# Patient Record
Sex: FEMALE | Race: WHITE | Employment: FULL TIME | URBAN - METROPOLITAN AREA
[De-identification: names, ages, dates, MRNs, and addresses within clinical notes are randomized per-mention and may not be internally consistent; named-entity substitution may affect disease eponyms.]

---

## 2017-07-17 RX ORDER — ESCITALOPRAM OXALATE 10 MG/1
TABLET ORAL
Qty: 90 TABLET | Refills: 0 | Status: SHIPPED | OUTPATIENT
Start: 2017-07-17 | End: 2017-10-21

## 2017-07-17 NOTE — TELEPHONE ENCOUNTER
LOV: 8/26/16  Future office visit: no upcoming visit   Last labs: no upcoming visit   Last RX: 8/26/16 #90 #3 Refill   Per protocol: Route to provider

## 2017-10-21 DIAGNOSIS — Z13.1 SCREENING FOR DIABETES MELLITUS: Primary | ICD-10-CM

## 2017-10-21 DIAGNOSIS — Z13.0 SCREENING FOR DEFICIENCY ANEMIA: ICD-10-CM

## 2017-10-21 DIAGNOSIS — Z13.220 SCREENING, LIPID: ICD-10-CM

## 2017-10-21 RX ORDER — ESCITALOPRAM OXALATE 10 MG/1
TABLET ORAL
Qty: 90 TABLET | Refills: 0 | Status: SHIPPED | OUTPATIENT
Start: 2017-10-21 | End: 2018-02-09

## 2017-10-21 NOTE — TELEPHONE ENCOUNTER
Please call patient. She is due for check-up and fasting labs. Orders are in.  They will need to be printed and sent or given to patient since she goes to [facility].

## 2017-10-21 NOTE — TELEPHONE ENCOUNTER
Medication(s) to Refill:   Pending Prescriptions Disp Refills    ESCITALOPRAM 10 MG Oral Tab [Pharmacy Med Name: ESCITALOPRAM  10MG  TAB] 90 tablet      Sig: TAKE 1 TABLET BY MOUTH  DAILY           Last Time Medication was Filled: 7/17/17- 90 tablets with

## 2017-10-31 NOTE — TELEPHONE ENCOUNTER
Future Appointments  Date Time Provider Nicolasa Dooley   11/13/2017 10:00 AM Sanjeev Better, DO [facility]     She uses [facility] aware must fast, Please call cell#  once ready for .  Pt will come in to  orders

## 2017-11-13 ENCOUNTER — OFFICE VISIT (OUTPATIENT)
Dept: INTERNAL MEDICINE CLINIC | Facility: CLINIC | Age: 62
End: 2017-11-13

## 2017-11-13 VITALS
HEIGHT: 67 IN | BODY MASS INDEX: 22.91 KG/M2 | DIASTOLIC BLOOD PRESSURE: 64 MMHG | TEMPERATURE: 98 F | WEIGHT: 146 LBS | RESPIRATION RATE: 16 BRPM | HEART RATE: 68 BPM | SYSTOLIC BLOOD PRESSURE: 110 MMHG

## 2017-11-13 DIAGNOSIS — B35.3 TINEA PEDIS OF BOTH FEET: ICD-10-CM

## 2017-11-13 DIAGNOSIS — Z12.11 SCREENING FOR MALIGNANT NEOPLASM OF COLON: ICD-10-CM

## 2017-11-13 DIAGNOSIS — Z12.31 VISIT FOR SCREENING MAMMOGRAM: ICD-10-CM

## 2017-11-13 DIAGNOSIS — Z00.00 ANNUAL PHYSICAL EXAM: Primary | ICD-10-CM

## 2017-11-13 DIAGNOSIS — L84 CORN OF FOOT: ICD-10-CM

## 2017-11-13 PROCEDURE — 99396 PREV VISIT EST AGE 40-64: CPT | Performed by: FAMILY MEDICINE

## 2017-11-13 PROCEDURE — 90471 IMMUNIZATION ADMIN: CPT | Performed by: FAMILY MEDICINE

## 2017-11-13 PROCEDURE — 99213 OFFICE O/P EST LOW 20 MIN: CPT | Performed by: FAMILY MEDICINE

## 2017-11-13 PROCEDURE — 90686 IIV4 VACC NO PRSV 0.5 ML IM: CPT | Performed by: FAMILY MEDICINE

## 2017-11-13 NOTE — PROGRESS NOTES
HPI:    Patient ID: Abel Perea is a 59year old female. HPI Here for annual check-up. Patient has warts on the bottom of her foot that she doesn't know what to do for. Has calluses as well and is now starting to notice pain when walking at times.  Margarito Brown rash.   Neurological: Negative for dizziness, weakness, numbness and headaches. Hematological: Negative for adenopathy. Does not bruise/bleed easily. Psychiatric/Behavioral: Negative for dysphoric mood. The patient is not nervous/anxious. go.   2. Reviewed preventive health recommendations with patient. Reviewed lab results. Encouraged regular exercise and healthy eating. 3. Order placed. Encouraged patient to have done.    4. Soak feet daily in warm soapy water, use file to gently file ca

## 2017-11-13 NOTE — PATIENT INSTRUCTIONS
Prevention Guidelines, Women Ages 48 to 59  Screening tests and vaccines are an important part of managing your health. Health counseling is essential, too. Below are guidelines for these, for women ages 48 to 59.  Talk with your healthcare provider to ma Lung cancer Adults age 54 to [de-identified] who have smoked Yearly screening in smokers with 30 pack-year history of smoking or who quit within 15 years   Obesity All women in this age group At routine exams   Osteoporosis Women who are postmenopausal Ask your healthc PPSV23: 1 to 2 doses through age 59, or 1 dose at 72 or older (protects against 23 types of pneumococcal bacteria)   Tetanus/diphtheria/pertussis (Td/Tdap) booster All women in this age group Td every 10 years, or a one-time dose of Tdap instead of a Td kenia

## 2017-11-14 ENCOUNTER — HOSPITAL ENCOUNTER (OUTPATIENT)
Dept: MAMMOGRAPHY | Age: 62
Discharge: HOME | End: 2017-11-14
Attending: FAMILY MEDICINE
Payer: COMMERCIAL

## 2017-11-14 DIAGNOSIS — Z12.31 VISIT FOR SCREENING MAMMOGRAM: ICD-10-CM

## 2017-11-14 PROCEDURE — 77067 SCR MAMMO BI INCL CAD: CPT | Performed by: FAMILY MEDICINE

## 2018-02-10 RX ORDER — ESCITALOPRAM OXALATE 10 MG/1
TABLET ORAL
Qty: 90 TABLET | Refills: 2 | Status: SHIPPED | OUTPATIENT
Start: 2018-02-10 | End: 2018-10-22

## 2018-02-10 NOTE — TELEPHONE ENCOUNTER
Medication(s) to Refill:   Pending Prescriptions Disp Refills    ESCITALOPRAM 10 MG Oral Tab [Pharmacy Med Name: ESCITALOPRAM  10MG  TAB] 90 tablet      Sig: TAKE 1 TABLET BY MOUTH  DAILY           Last Time Medication was Filled:  10/21/17- 90 tablets wit

## 2018-10-22 DIAGNOSIS — Z13.1 SCREENING FOR DIABETES MELLITUS: ICD-10-CM

## 2018-10-22 DIAGNOSIS — Z13.220 SCREENING, LIPID: Primary | ICD-10-CM

## 2018-10-22 DIAGNOSIS — Z13.0 SCREENING FOR DEFICIENCY ANEMIA: ICD-10-CM

## 2018-10-23 RX ORDER — ESCITALOPRAM OXALATE 10 MG/1
TABLET ORAL
Qty: 90 TABLET | Refills: 0 | Status: SHIPPED | OUTPATIENT
Start: 2018-10-23 | End: 2018-12-16

## 2018-10-23 NOTE — TELEPHONE ENCOUNTER
E request  LOV: 11/13/17- annual px  Last rx: 2/10/10- 90 tablets with 2 refills  No future appointments.   Per protocol to provider

## 2018-10-23 NOTE — TELEPHONE ENCOUNTER
Please call patient. She will be due for annual check-up on or after 11/13/18. She goes to [facility] for her labs so orders are in and will need to be printed and sent to her or she can pick them up.

## 2018-11-02 NOTE — TELEPHONE ENCOUNTER
Future Appointments   Date Time Provider Nicolasa Soumya   11/19/2018 10:00 AM Raquel Hint, DO [facility]     Put lab orders up front for . Pt aware they are up there.

## 2018-11-19 ENCOUNTER — OFFICE VISIT (OUTPATIENT)
Dept: INTERNAL MEDICINE CLINIC | Facility: CLINIC | Age: 63
End: 2018-11-19
Payer: COMMERCIAL

## 2018-11-19 VITALS
WEIGHT: 148 LBS | RESPIRATION RATE: 16 BRPM | DIASTOLIC BLOOD PRESSURE: 80 MMHG | BODY MASS INDEX: 23 KG/M2 | SYSTOLIC BLOOD PRESSURE: 118 MMHG | HEART RATE: 72 BPM | TEMPERATURE: 98 F

## 2018-11-19 DIAGNOSIS — M79.644 PAIN OF FINGER OF RIGHT HAND: ICD-10-CM

## 2018-11-19 DIAGNOSIS — Z11.59 NEED FOR HEPATITIS C SCREENING TEST: ICD-10-CM

## 2018-11-19 DIAGNOSIS — Z13.0 SCREENING FOR DEFICIENCY ANEMIA: ICD-10-CM

## 2018-11-19 DIAGNOSIS — Z00.00 ANNUAL PHYSICAL EXAM: Primary | ICD-10-CM

## 2018-11-19 DIAGNOSIS — M85.80 OSTEOPENIA, UNSPECIFIED LOCATION: ICD-10-CM

## 2018-11-19 DIAGNOSIS — Z12.31 VISIT FOR SCREENING MAMMOGRAM: ICD-10-CM

## 2018-11-19 PROCEDURE — 99396 PREV VISIT EST AGE 40-64: CPT | Performed by: FAMILY MEDICINE

## 2018-11-19 NOTE — PATIENT INSTRUCTIONS
Prevention Guidelines, Women Ages 48 to 59  Screening tests and vaccines are an important part of managing your health. A screening test is done to find possible disorders or diseases in people who don't have any symptoms.  The goal is to find a disease e Chlamydia Women at increased risk for infection At routine exams   Colorectal cancer All women in this age group Flexible sigmoidoscopy every 5 years, or colonoscopy every 10 years, or double-contrast barium enema every 5 years; yearly fecal occult blood t Hepatitis B Women at increased risk for infection – talk with your healthcare provider 3 doses over 6 months; second dose should be given 1 month after the first dose; the third dose should be given at least 2 months after the second dose and at least 4 mo Use of daily aspirin Women ages 54 and up in this age group who are at risk for cardiovascular health problems such as stroke When your risk is known   Use of tobacco and the health effects it can cause All women in this age group Every exam   1Amereryn Ca

## 2018-11-19 NOTE — PROGRESS NOTES
HPI:    Patient ID: Franklin Ryder is a 57year old female. HPI Here for annual check-up. Patient notes swollen DIP joint of right 2nd finger that at times is painful but not enough to try to do something about it at this time.    Staying physically acti Hobby Hazards: Not Asked        Sleep Concern: Not Asked        Back Care: Not Asked        Bike Helmet: Not Asked        Self-Exams: Not Asked    Social History Narrative      Not on file    Family History   Adopted: Yes   Family history unknown: Bishop Sauceda Cardiovascular: Normal rate, regular rhythm and normal heart sounds. Pulmonary/Chest: Effort normal and breath sounds normal. Right breast exhibits no inverted nipple, no mass, no nipple discharge, no skin change and no tenderness.  Left breast exhibits n

## 2018-11-26 ENCOUNTER — NURSE ONLY (OUTPATIENT)
Dept: INTERNAL MEDICINE CLINIC | Facility: CLINIC | Age: 63
End: 2018-11-26
Payer: COMMERCIAL

## 2018-11-26 PROCEDURE — 90472 IMMUNIZATION ADMIN EACH ADD: CPT | Performed by: FAMILY MEDICINE

## 2018-11-26 PROCEDURE — 90653 IIV ADJUVANT VACCINE IM: CPT | Performed by: FAMILY MEDICINE

## 2018-11-26 PROCEDURE — 90471 IMMUNIZATION ADMIN: CPT | Performed by: FAMILY MEDICINE

## 2018-11-26 PROCEDURE — 90670 PCV13 VACCINE IM: CPT | Performed by: FAMILY MEDICINE

## 2018-11-26 NOTE — PROGRESS NOTES
Pt here for flu shot and Prevnar 13, tolerated both well without difficulty in R arm. Both VIS given. Consent sent to scanning.

## 2018-11-30 ENCOUNTER — TELEPHONE (OUTPATIENT)
Dept: INTERNAL MEDICINE CLINIC | Facility: CLINIC | Age: 63
End: 2018-11-30

## 2018-11-30 NOTE — TELEPHONE ENCOUNTER
Please call patient.  We received the results of the additional blood tests she did- CBC and Hep C and everything is normal.

## 2018-12-13 ENCOUNTER — HOSPITAL ENCOUNTER (OUTPATIENT)
Dept: MAMMOGRAPHY | Age: 63
Discharge: HOME | End: 2018-12-13
Attending: FAMILY MEDICINE
Payer: COMMERCIAL

## 2018-12-13 ENCOUNTER — HOSPITAL ENCOUNTER (OUTPATIENT)
Dept: BONE DENSITY | Age: 63
Discharge: HOME | End: 2018-12-13
Attending: FAMILY MEDICINE
Payer: COMMERCIAL

## 2018-12-13 DIAGNOSIS — Z12.31 VISIT FOR SCREENING MAMMOGRAM: ICD-10-CM

## 2018-12-13 DIAGNOSIS — M85.80 OSTEOPENIA, UNSPECIFIED LOCATION: ICD-10-CM

## 2018-12-13 PROCEDURE — 77080 DXA BONE DENSITY AXIAL: CPT | Performed by: FAMILY MEDICINE

## 2018-12-13 PROCEDURE — 77063 BREAST TOMOSYNTHESIS BI: CPT | Performed by: FAMILY MEDICINE

## 2018-12-13 PROCEDURE — 77067 SCR MAMMO BI INCL CAD: CPT | Performed by: FAMILY MEDICINE

## 2018-12-17 RX ORDER — ESCITALOPRAM OXALATE 10 MG/1
TABLET ORAL
Qty: 90 TABLET | Refills: 2 | Status: SHIPPED | OUTPATIENT
Start: 2018-12-17 | End: 2020-11-11

## 2018-12-17 NOTE — TELEPHONE ENCOUNTER
E request  LOV: 11/19/18- annual px  Last labs: 10/23/18- 90 tablets with 0 refills  No future appointments.   Per protocol to provider

## 2020-10-28 ENCOUNTER — TELEPHONE (OUTPATIENT)
Dept: INTERNAL MEDICINE CLINIC | Facility: CLINIC | Age: 65
End: 2020-10-28

## 2020-10-28 DIAGNOSIS — Z13.0 SCREENING FOR DISORDER OF BLOOD AND BLOOD-FORMING ORGANS: ICD-10-CM

## 2020-10-28 DIAGNOSIS — Z13.228 SCREENING FOR METABOLIC DISORDER: ICD-10-CM

## 2020-10-28 DIAGNOSIS — Z13.220 SCREENING FOR LIPID DISORDERS: ICD-10-CM

## 2020-10-28 DIAGNOSIS — Z11.59 NEED FOR HEPATITIS C SCREENING TEST: ICD-10-CM

## 2020-10-28 DIAGNOSIS — Z00.00 ANNUAL PHYSICAL EXAM: Primary | ICD-10-CM

## 2020-10-28 NOTE — TELEPHONE ENCOUNTER
Future Appointments   Date Time Provider Nicolasa Soumya   11/11/2020  2:00 PM Jaun Parrish,  [facility]     [facility]- Pt informed that labs need to be completed no sooner than 2 weeks prior to the appt.  Pt aware to fast-no call gayathri

## 2020-10-30 NOTE — TELEPHONE ENCOUNTER
Pending labs for review    Future Appointments   Date Time Provider Nicolasa Dooley   11/11/2020  2:00 PM Radha Chu, DO [facility]

## 2020-11-11 ENCOUNTER — OFFICE VISIT (OUTPATIENT)
Dept: INTERNAL MEDICINE CLINIC | Facility: CLINIC | Age: 65
End: 2020-11-11
Payer: COMMERCIAL

## 2020-11-11 VITALS
RESPIRATION RATE: 16 BRPM | HEIGHT: 64.84 IN | DIASTOLIC BLOOD PRESSURE: 80 MMHG | TEMPERATURE: 98 F | HEART RATE: 96 BPM | SYSTOLIC BLOOD PRESSURE: 136 MMHG | BODY MASS INDEX: 22.37 KG/M2 | WEIGHT: 134.25 LBS

## 2020-11-11 DIAGNOSIS — Z00.00 ANNUAL PHYSICAL EXAM: Primary | ICD-10-CM

## 2020-11-11 DIAGNOSIS — M85.80 OSTEOPENIA, UNSPECIFIED LOCATION: ICD-10-CM

## 2020-11-11 DIAGNOSIS — B07.0 PLANTAR WARTS: ICD-10-CM

## 2020-11-11 DIAGNOSIS — Z12.31 ENCOUNTER FOR SCREENING MAMMOGRAM FOR MALIGNANT NEOPLASM OF BREAST: ICD-10-CM

## 2020-11-11 PROCEDURE — 3008F BODY MASS INDEX DOCD: CPT | Performed by: FAMILY MEDICINE

## 2020-11-11 PROCEDURE — 3079F DIAST BP 80-89 MM HG: CPT | Performed by: FAMILY MEDICINE

## 2020-11-11 PROCEDURE — 90471 IMMUNIZATION ADMIN: CPT | Performed by: FAMILY MEDICINE

## 2020-11-11 PROCEDURE — 90750 HZV VACC RECOMBINANT IM: CPT | Performed by: FAMILY MEDICINE

## 2020-11-11 PROCEDURE — 99397 PER PM REEVAL EST PAT 65+ YR: CPT | Performed by: FAMILY MEDICINE

## 2020-11-11 PROCEDURE — 90662 IIV NO PRSV INCREASED AG IM: CPT | Performed by: FAMILY MEDICINE

## 2020-11-11 PROCEDURE — 90732 PPSV23 VACC 2 YRS+ SUBQ/IM: CPT | Performed by: FAMILY MEDICINE

## 2020-11-11 PROCEDURE — 3075F SYST BP GE 130 - 139MM HG: CPT | Performed by: FAMILY MEDICINE

## 2020-11-11 PROCEDURE — 90472 IMMUNIZATION ADMIN EACH ADD: CPT | Performed by: FAMILY MEDICINE

## 2020-11-11 NOTE — PROGRESS NOTES
HPI:    Patient ID: Andrei River is a 75year old female. HPI Here for annual check-up. Patient walks for exercise. Is aware she is due for mammogram and bone density scan. Eats healthy. Notes warts on bottoms of feet. Has had for years.  Files them Outpatient Medications   Medication Sig Dispense Refill   • Multiple Vitamins-Minerals (MULTIVITAMIN ADULT OR) Take by mouth.      • Fexofenadine HCl (ALLEGRA) 180 MG Oral Tab        Allergies:No Known Allergies   PHYSICAL EXAM:   Physical Exam   [facility] & Referrals:  FLU VACC HIGH DOSE PRSV FREE  ZOSTER VACC RECOMBINANT IM NJX  PNEUMOCOCCAL IMM (PNEUMOVAX)  EDWIN SHIRA 2D+3D SCREENING BILAT (CPT=77082/58113)  XR DEXA BONE DENSITOMETRY (CPT=77064)       YP#[id number]

## 2020-11-11 NOTE — PATIENT INSTRUCTIONS
Make sure to get 1200mg of Calcium and 1000IU of vitamin D3 daily. Prevention Guidelines, Women Ages 72 and Older  Screening tests and vaccines are an important part of managing your health.  A screening test is done to find possible disorders or dise continue screening. For women 80 and older, screening is not needed. Multiple tests are available and are used at different times.  Possible tests include:  · Flexible sigmoidoscopy every 5 years, or  · Colonoscopy every 10 years, or  · CT colonography (vir routine exams; talk with your healthcare provider   Thyroid-stimulating hormone (TSH) Only women in this age group with symptoms of thyroid dysfunction Talk with your healthcare provider   Tuberculosis Women at increased risk for infection Talk with your h given in a series of 2 doses. The 2nd dose is given 2 to 6 months after the first. This is given even if you've had shingles before or had a previous zoster live vaccine.   · Zoster live vaccine live (ZVL; Zostavax) may be given to healthy adults over age 10

## 2020-12-07 ENCOUNTER — HOSPITAL ENCOUNTER (OUTPATIENT)
Dept: MAMMOGRAPHY | Age: 65
Discharge: HOME | End: 2020-12-07
Attending: FAMILY MEDICINE
Payer: COMMERCIAL

## 2020-12-07 ENCOUNTER — HOSPITAL ENCOUNTER (OUTPATIENT)
Dept: BONE DENSITY | Age: 65
Discharge: HOME | End: 2020-12-07
Attending: FAMILY MEDICINE
Payer: COMMERCIAL

## 2020-12-07 DIAGNOSIS — Z12.31 ENCOUNTER FOR SCREENING MAMMOGRAM FOR MALIGNANT NEOPLASM OF BREAST: ICD-10-CM

## 2020-12-07 DIAGNOSIS — M85.80 OSTEOPENIA, UNSPECIFIED LOCATION: ICD-10-CM

## 2020-12-07 PROCEDURE — 77080 DXA BONE DENSITY AXIAL: CPT | Performed by: FAMILY MEDICINE

## 2020-12-07 PROCEDURE — 77067 SCR MAMMO BI INCL CAD: CPT | Performed by: FAMILY MEDICINE

## 2020-12-07 PROCEDURE — 77063 BREAST TOMOSYNTHESIS BI: CPT | Performed by: FAMILY MEDICINE

## 2020-12-08 ENCOUNTER — TELEPHONE (OUTPATIENT)
Dept: INTERNAL MEDICINE CLINIC | Facility: CLINIC | Age: 65
End: 2020-12-08

## 2020-12-08 RX ORDER — ALENDRONATE SODIUM 70 MG/1
70 TABLET ORAL WEEKLY
Qty: 12 TABLET | Refills: 3 | Status: SHIPPED | OUTPATIENT
Start: 2020-12-08 | End: 2021-03-05

## 2020-12-08 NOTE — TELEPHONE ENCOUNTER
[facility] called wanting the CPT code for a MBI test that [facility] wants pt to have-please call [phone] Provider line with the number and also the patient back with the CPT number-they are trying to see if this will be covered under her insurance

## 2020-12-08 NOTE — TELEPHONE ENCOUNTER
LM for pt at [phone] (M) [place] [address] for MBI and to call [facility] to let them know to determine coverage. Asked pt to call back if any questions.

## 2020-12-10 NOTE — TELEPHONE ENCOUNTER
Pt agreeable to re-start Fosamax. Has already picked up from pharmacy. Pt spoke to insurance and declining MBI at this time. States they will cover, but will have to pay full deductible.

## 2021-02-15 ENCOUNTER — TELEPHONE (OUTPATIENT)
Dept: INTERNAL MEDICINE CLINIC | Facility: CLINIC | Age: 66
End: 2021-02-15

## 2021-02-15 DIAGNOSIS — Z23 NEED FOR SHINGLES VACCINE: Primary | ICD-10-CM

## 2021-02-15 NOTE — TELEPHONE ENCOUNTER
Future Appointments   Date Time Provider Nicolasa Dooley   2/17/2021  2:30 PM [facility]     Pt coming for #2 shingles shot-please enter order

## 2021-02-17 ENCOUNTER — NURSE ONLY (OUTPATIENT)
Dept: INTERNAL MEDICINE CLINIC | Facility: CLINIC | Age: 66
End: 2021-02-17
Payer: COMMERCIAL

## 2021-02-17 PROCEDURE — 90750 HZV VACC RECOMBINANT IM: CPT | Performed by: FAMILY MEDICINE

## 2021-02-17 PROCEDURE — 90471 IMMUNIZATION ADMIN: CPT | Performed by: FAMILY MEDICINE

## 2021-03-04 NOTE — TELEPHONE ENCOUNTER
Prescription Refill Request - Patient advised can take 48-72 hours. Name of Medication (strength, dose, qty requested:     alendronate (FOSAMAX) 70 MG Oral Tab 12 tablet 3 12/8/2020    Sig:   Take 1 tablet (70 mg total) by mouth once a week.        Which

## 2021-03-05 RX ORDER — ALENDRONATE SODIUM 70 MG/1
70 TABLET ORAL WEEKLY
Qty: 12 TABLET | Refills: 3 | Status: SHIPPED | OUTPATIENT
Start: 2021-03-05 | End: 2021-12-27

## 2021-03-05 NOTE — TELEPHONE ENCOUNTER
LOV:11/11/20, CPE, AMS  Last CPE:11/11/20, CPE, AMS  Last refill:alendronate (FOSAMAX) 70 MG Oral Tab,12/8/20  Quantity:12 tablet, 3 refills  Last labs that are related: cbc, cmp, lipid 11/03/20  Future appointment:No future appointments.   Protocol: pend f

## 2021-03-15 DIAGNOSIS — Z23 NEED FOR VACCINATION: ICD-10-CM

## 2021-03-16 ENCOUNTER — IMMUNIZATION (OUTPATIENT)
Dept: LAB | Age: 66
End: 2021-03-16
Attending: HOSPITALIST
Payer: COMMERCIAL

## 2021-03-16 DIAGNOSIS — Z23 NEED FOR VACCINATION: Primary | ICD-10-CM

## 2021-03-16 PROCEDURE — 0001A SARSCOV2 VAC 30MCG/0.3ML IM: CPT

## 2021-04-06 ENCOUNTER — IMMUNIZATION (OUTPATIENT)
Dept: LAB | Age: 66
End: 2021-04-06
Attending: HOSPITALIST
Payer: COMMERCIAL

## 2021-04-06 DIAGNOSIS — Z23 NEED FOR VACCINATION: Primary | ICD-10-CM

## 2021-04-06 PROCEDURE — 0002A SARSCOV2 VAC 30MCG/0.3ML IM: CPT

## 2021-10-22 ENCOUNTER — TELEPHONE (OUTPATIENT)
Dept: INTERNAL MEDICINE CLINIC | Facility: CLINIC | Age: 66
End: 2021-10-22

## 2021-10-22 DIAGNOSIS — Z13.220 SCREENING FOR LIPID DISORDERS: ICD-10-CM

## 2021-10-22 DIAGNOSIS — Z13.0 SCREENING FOR DISORDER OF BLOOD AND BLOOD-FORMING ORGANS: ICD-10-CM

## 2021-10-22 DIAGNOSIS — Z00.00 ANNUAL PHYSICAL EXAM: Primary | ICD-10-CM

## 2021-10-22 DIAGNOSIS — Z13.228 SCREENING FOR METABOLIC DISORDER: ICD-10-CM

## 2021-10-22 NOTE — TELEPHONE ENCOUNTER
Future Appointments   Date Time Provider Nicolasa Dooley   11/16/2021  9:00 AM James Viera DO [facility]     Orders to   [facility]  aware must fast no call back required

## 2021-11-16 ENCOUNTER — OFFICE VISIT (OUTPATIENT)
Dept: INTERNAL MEDICINE CLINIC | Facility: CLINIC | Age: 66
End: 2021-11-16
Payer: COMMERCIAL

## 2021-11-16 VITALS
HEART RATE: 68 BPM | OXYGEN SATURATION: 98 % | BODY MASS INDEX: 22.69 KG/M2 | WEIGHT: 136.19 LBS | DIASTOLIC BLOOD PRESSURE: 66 MMHG | HEIGHT: 64.84 IN | SYSTOLIC BLOOD PRESSURE: 124 MMHG

## 2021-11-16 DIAGNOSIS — N39.46 MIXED INCONTINENCE URGE AND STRESS (MALE)(FEMALE): ICD-10-CM

## 2021-11-16 DIAGNOSIS — M85.80 OSTEOPENIA, UNSPECIFIED LOCATION: ICD-10-CM

## 2021-11-16 DIAGNOSIS — Z00.00 ANNUAL PHYSICAL EXAM: Primary | ICD-10-CM

## 2021-11-16 DIAGNOSIS — Z12.31 VISIT FOR SCREENING MAMMOGRAM: ICD-10-CM

## 2021-11-16 PROCEDURE — 3008F BODY MASS INDEX DOCD: CPT | Performed by: FAMILY MEDICINE

## 2021-11-16 PROCEDURE — 3078F DIAST BP <80 MM HG: CPT | Performed by: FAMILY MEDICINE

## 2021-11-16 PROCEDURE — 90472 IMMUNIZATION ADMIN EACH ADD: CPT | Performed by: FAMILY MEDICINE

## 2021-11-16 PROCEDURE — 3074F SYST BP LT 130 MM HG: CPT | Performed by: FAMILY MEDICINE

## 2021-11-16 PROCEDURE — 90715 TDAP VACCINE 7 YRS/> IM: CPT | Performed by: FAMILY MEDICINE

## 2021-11-16 PROCEDURE — 90471 IMMUNIZATION ADMIN: CPT | Performed by: FAMILY MEDICINE

## 2021-11-16 PROCEDURE — 99397 PER PM REEVAL EST PAT 65+ YR: CPT | Performed by: FAMILY MEDICINE

## 2021-11-16 PROCEDURE — 90662 IIV NO PRSV INCREASED AG IM: CPT | Performed by: FAMILY MEDICINE

## 2021-12-23 ENCOUNTER — HOSPITAL ENCOUNTER (OUTPATIENT)
Dept: MAMMOGRAPHY | Age: 66
Discharge: HOME | End: 2021-12-23
Attending: FAMILY MEDICINE
Payer: COMMERCIAL

## 2021-12-23 DIAGNOSIS — Z12.31 VISIT FOR SCREENING MAMMOGRAM: ICD-10-CM

## 2021-12-23 PROCEDURE — 77063 BREAST TOMOSYNTHESIS BI: CPT | Performed by: FAMILY MEDICINE

## 2021-12-23 PROCEDURE — 77067 SCR MAMMO BI INCL CAD: CPT | Performed by: FAMILY MEDICINE

## 2021-12-27 RX ORDER — ALENDRONATE SODIUM 70 MG/1
TABLET ORAL
Qty: 12 TABLET | Refills: 3 | Status: SHIPPED | OUTPATIENT
Start: 2021-12-27

## 2021-12-27 NOTE — TELEPHONE ENCOUNTER
Last OV 11/16/21  Last PE 11/16/21  Last REFILL   Medication Quantity Refills Start End   alendronate (FOSAMAX) 70 MG Oral Tab 12 tablet 3 3/5/2021      Last LABS Cmp, cbc, lipid 11/9/21    Future Appointments   Date Time Provider Nicolasa Dooley   1/13/

## 2022-01-12 ENCOUNTER — TELEPHONE (OUTPATIENT)
Dept: PHYSICAL THERAPY | Facility: HOSPITAL | Age: 67
End: 2022-01-12

## 2022-01-13 ENCOUNTER — OFFICE VISIT (OUTPATIENT)
Dept: PHYSICAL THERAPY | Facility: HOSPITAL | Age: 67
End: 2022-01-13
Attending: FAMILY MEDICINE
Payer: COMMERCIAL

## 2022-01-13 DIAGNOSIS — N39.46 MIXED INCONTINENCE URGE AND STRESS (MALE)(FEMALE): ICD-10-CM

## 2022-01-13 PROCEDURE — 97110 THERAPEUTIC EXERCISES: CPT

## 2022-01-13 PROCEDURE — 97162 PT EVAL MOD COMPLEX 30 MIN: CPT

## 2022-01-13 PROCEDURE — 97112 NEUROMUSCULAR REEDUCATION: CPT

## 2022-01-13 NOTE — PROGRESS NOTES
MUSCULOSKELETAL AND PELVIC FLOOR EVALUATION:   Referring Physician: Dr. David Johnson  Diagnosis: Mixed incontinence urge and stress (male)(female) (N39.46)          Date of Service: 1/13/2022     PATIENT SUMMARY   Catina Doherty is a 74year old female  wh yes    BOWEL HABITS  Types of symptoms: other some times strains   Frequency of bowel movements: daily , some times 2x/day  Stool consistency: [place] Stool Scale: 2-4  Do you strain with defecation: Yes -  Some   Laxative use: No    SEXUAL HEALTH STATUS WNL    Sensory/Reflex:  Vestibule: normal bilaterally  Anal [place]: Not Tested    Internal Examination   Scar: none    Pelvic Floor Muscle strength: (PERF= Power/Endurance/Reps/Fast) MMT: 2/4/4/5  External Anal Sphincter: nt  Accessory Muscle Use: abdominals able to reduce urinary urge and prevent leakage during ADL's.  4. Nocturnal voiding 1x/night for improved sleep. 5. PFM contraction before increase intra-abdominal pressure.   6. Pt will have increased transverse abdominis muscle strength to 2/5 and hip st

## 2022-01-20 ENCOUNTER — OFFICE VISIT (OUTPATIENT)
Dept: PHYSICAL THERAPY | Facility: HOSPITAL | Age: 67
End: 2022-01-20
Attending: FAMILY MEDICINE
Payer: COMMERCIAL

## 2022-01-20 DIAGNOSIS — N39.46 MIXED INCONTINENCE URGE AND STRESS (MALE)(FEMALE): ICD-10-CM

## 2022-01-20 PROCEDURE — 97112 NEUROMUSCULAR REEDUCATION: CPT

## 2022-01-20 PROCEDURE — 97110 THERAPEUTIC EXERCISES: CPT

## 2022-01-20 NOTE — PROGRESS NOTES
Dx: Mixed incontinence urge and stress (male)(female) (N39.46)         Insurance (Authorized # of Visits):  No auth, no limit           Authorizing Physician: Dr. Georgia Mayorga  Next MD visit: none scheduled  Fall Risk: standard         Precautions: 2 level an breathing + abdominal bracing x10  diaphragmatic breathing +abdominal bracing +kegel x10    pelvic brace x10 + handout                     HEP: Kegel 10 repetitions 3x/day, 10 sec on/10 sec off, 2 sec on 2-4 sec off     Charges: Sarah Beth, NM Re-edx2       Total 2-4  Do you strain with defecation: Yes -  Some     Range Of Motion  Lumbar AROM screen: wfl  LE AROM screen: grossly WNL except: B hip IR 25%-no c/o pain    Strength (MMT) 5/5 AMADEO LE except B hip abduction and extension 4-/5  Transverse Abdominis: 1/5

## 2022-01-27 ENCOUNTER — OFFICE VISIT (OUTPATIENT)
Dept: PHYSICAL THERAPY | Facility: HOSPITAL | Age: 67
End: 2022-01-27
Attending: FAMILY MEDICINE
Payer: COMMERCIAL

## 2022-01-27 DIAGNOSIS — N39.46 MIXED INCONTINENCE URGE AND STRESS (MALE)(FEMALE): ICD-10-CM

## 2022-01-27 PROCEDURE — 97110 THERAPEUTIC EXERCISES: CPT

## 2022-01-27 PROCEDURE — 97112 NEUROMUSCULAR REEDUCATION: CPT

## 2022-01-27 NOTE — PROGRESS NOTES
Dx: Mixed incontinence urge and stress (male)(female) (N39.46)         Insurance (Authorized # of Visits):  No auth, no limit           Authorizing Physician: Dr. Patrice Grimm  Next MD visit: none scheduled  Fall Risk: standard         Precautions: 2 level an mm    Date: 1/20/2022  TX#: 2/10 Date:       1/27/2022   TX#: 3/ Date:                 TX#: 4/ Date:                 TX#: 5/ Date:    Tx#: 6/   PFM NM  Re-ed   Bladder diary  Bladder fitness + handout  Night time techniques+ handout  physiological quieting include  has a past medical history of Allergic rhinitis, Belching, Flatulence/gas pain/belching, Hemorrhoids, History of depression (04/09/2016), Osteopenia, Tubular adenoma (12/07)  -sees chiropractor for neck and back    Precautions:  None    URINARY HA

## 2022-02-03 ENCOUNTER — OFFICE VISIT (OUTPATIENT)
Dept: PHYSICAL THERAPY | Facility: HOSPITAL | Age: 67
End: 2022-02-03
Attending: FAMILY MEDICINE
Payer: COMMERCIAL

## 2022-02-03 DIAGNOSIS — N39.46 MIXED INCONTINENCE URGE AND STRESS (MALE)(FEMALE): ICD-10-CM

## 2022-02-03 PROCEDURE — 97110 THERAPEUTIC EXERCISES: CPT

## 2022-02-03 PROCEDURE — 97112 NEUROMUSCULAR REEDUCATION: CPT

## 2022-02-03 NOTE — PROGRESS NOTES
Dx: Mixed incontinence urge and stress (male)(female) (N39.46)         Insurance (Authorized # of Visits):  No auth, no limit           Authorizing Physician: Dr. Georgia Mayorga  Next MD visit: none scheduled  Fall Risk: standard         Precautions: 2 level anterior cervical fusion 20 years ago          Subjective: Having less leakage. Doing strategies and HEP. Pain: 0/10      Objective: Tx flow sheet  1/27/22  Biofeedback: Recruitment good, holding ability poor, derecruitment good, baseline between contractions . 05-2.0Ua, baseline resting average . 05Ua (normal 2.0Ua)        Assessment: Good tolerance to therapy with improved strength and coordination via contraction of fascially connected muscles. Pt required verbal and manual cueing throughout treatment for correct execution of exercises. Upgraded HEP and issued RTB. Goals:   Goals: (to be met in 10 visits)-progressing   1. Independent in HEP and progression with improved understanding of bladder/bowel health, bladder retraining and long-term management and more neutral posture. 2. Patient will demonstrate improved bladder voiding habits to voiding every 2 hours without urinary leakage. 3. Patient will demonstrate improve PFM isolation, coordination and strength to 3/8/8/8 so she is able to reduce urinary urge and prevent leakage during ADL's.  4. Nocturnal voiding 1x/night for improved sleep. 5. PFM contraction before increase intra-abdominal pressure. 6. Pt will have increased transverse abdominis muscle strength to 2/5 and hip strength to 4+/5 to assist with supporting pelvic floor muscles. Plan: Continue plan of care as pt tolerates with focus on pelvic floor muscle strengthening and coordination.  Next visit: progress pelvic floor muscle contraction with fascially connected mm  -progress with activities of daily living   Date: 1/20/2022  TX#: 2/10 Date:       1/27/2022   TX#: 3/ Date:     2/3/2022             TX#: 4/ Date:                 TX#: 5/ Date: Tx#: 6/   PFM NM  Re-ed   Bladder diary  Bladder fitness + handout  Night time techniques+ handout  physiological quieting   Sleep suggestions   Emptying strategies  Bladder retraining  biofeedback -  external sensors were placed and leads were attached  Resting tone  Tonic  Phasic  [place]  With coordinated breathing    [place]-  diaphragmatic breathing   x10  diaphragmatic breathing +abdominal bracing x10    diaphragmatic breathing + abdominal bracing + kegel x10    kegel +  *Sit<>stand  *March  *Stand      Biofeedback with constant interpretation of results. Kegel +  iso hip add  10\" x10   Kegel +  Resisted   ER RTB  x10   Kegel + clamshell RTB x10 R/L    Kegel +  U/LE lift  x10     Kegel + articulating bridge x10 + iso hip add  Kegel with plie/ER resisted RTB x10   Issued HEP for above with latex free RTB         diaphragmatic breathing x10  diaphragmatic breathing + abdominal bracing x10  diaphragmatic breathing +abdominal bracing +kegel x10    pelvic brace x10 + handout Sit on ball-  x2 min  quick flicks N49 NuStep 5min L5   Shuttle- DLP  25# with pelvic brace + iso hip adduction x30 reps         HEP: Kegel 10 repetitions 3x/day, 10 sec on/10 sec off, 2 sec on 2-4 sec off , see above for additions    Charges: TEx2, NM Re-edx1       Total Timed Treatment: 45 min  Total Treatment Time: 45 min    Initial evaluation:   PATIENT SUMMARY   Estefany Palma is a 74year old female  who presents to therapy today with complaints of leakage with coughing, sneezing, lifting, laughing hard and some times with urgency that she has had for years and has worsened over the past month. Current symptoms include: urgency/frequency, incomplete emptying and leakage    PFDI-20: 86.46/300; Impairment= 28.8 %    Cady Doimnguez describes prior level of function less urgency and leakage. Pt goals include improve strength of pelvic floor muscles to decrease/resolve urgency and leakage. Past medical history was reviewed with Cady Dominguez. Significant findings include  has a past medical history of Allergic rhinitis, Belching, Flatulence/gas pain/belching, Hemorrhoids, History of depression (04/09/2016), Osteopenia, Tubular adenoma (12/07)  -sees chiropractor for neck and back    Precautions:  None    URINARY HABITS  Types of symptoms: stress incontinence, urge incontinence and nocturia  Leaking occurs: NEGIN, UI  Episodes of Leakage: 3 times per week  Amount of leakage: minimal  Nocturia: 2  Fluid Intake: some water  Bladder irritants: diet dr pepper 6 cans  Urine Stop test: difficulty  Empty bladder just in case: yes  Do you ever leak urine without knowing it?  yes    BOWEL HABITS  Types of symptoms: other some times strains   Frequency of bowel movements: daily , some times 2x/day  Stool consistency: [place] Stool Scale: 2-4  Do you strain with defecation: Yes -  Some     Range Of Motion  Lumbar AROM screen: wfl  LE AROM screen: grossly WNL except: B hip IR 25%-no c/o pain    Strength (MMT) 5/5 AMADEO LE except B hip abduction and extension 4-/5  Transverse Abdominis: 1/5    Internal Examination   Pelvic Floor Muscle strength: (PERF= Power/Endurance/Reps/Fast) MMT: 2/4/4/5  Accessory Muscle Use: abdominals

## 2022-02-10 ENCOUNTER — OFFICE VISIT (OUTPATIENT)
Dept: PHYSICAL THERAPY | Facility: HOSPITAL | Age: 67
End: 2022-02-10
Attending: FAMILY MEDICINE
Payer: COMMERCIAL

## 2022-02-10 DIAGNOSIS — N39.46 MIXED INCONTINENCE URGE AND STRESS (MALE)(FEMALE): ICD-10-CM

## 2022-02-10 PROCEDURE — 97110 THERAPEUTIC EXERCISES: CPT

## 2022-02-10 PROCEDURE — 97112 NEUROMUSCULAR REEDUCATION: CPT

## 2022-02-10 NOTE — PROGRESS NOTES
Dx: Mixed incontinence urge and stress (male)(female) (N39.46)         Insurance (Authorized # of Visits):  No auth, no limit           Authorizing Physician: Dr. Maximus Ramesh  Next MD visit: none scheduled  Fall Risk: standard         Precautions: 2 level anterior cervical fusion 20 years ago          Subjective: Having no leakage this past week. Doing strategies and HEP. Nocturia 1-2x. Pain: 0/10      Objective: Tx flow sheet  1/27/22  Biofeedback: Recruitment good, holding ability poor, derecruitment good, baseline between contractions . 05-2.0Ua, baseline resting average . 05Ua (normal 2.0Ua)        Assessment: Good tolerance to therapy with improved strength and coordination via contraction of fascially connected muscles with simulated activities of daily living . Pt required verbal and manual cueing throughout treatment for correct execution of exercises. Patient instructed to: preform pelvic brace with activities of daily living . Goals:   Goals: (to be met in 10 visits)-progressing   1. Independent in HEP and progression with improved understanding of bladder/bowel health, bladder retraining and long-term management and more neutral posture. 2. Patient will demonstrate improved bladder voiding habits to voiding every 2 hours without urinary leakage. -MET  3. Patient will demonstrate improve PFM isolation, coordination and strength to 3/8/8/8 so she is able to reduce urinary urge and prevent leakage during ADL's.  4. Nocturnal voiding 1x/night for improved sleep. -MET  5. PFM contraction before increase intra-abdominal pressure. -MET  6. Pt will have increased transverse abdominis muscle strength to 2/5 and hip strength to 4+/5 to assist with supporting pelvic floor muscles. Plan: Continue plan of care as pt tolerates with focus on pelvic floor muscle strengthening and coordination.  Next visit: progress pelvic floor muscle contraction with fascially connected mm  -progress with activities of daily living , continue  Date: 1/20/2022  TX#: 2/10 Date:       1/27/2022   TX#: 3/ Date:     2/3/2022             TX#: 4/ Date:           2/10/2022       TX#: 5/ Date: Tx#: 6/   PFM NM  Re-ed   Bladder diary  Bladder fitness + handout  Night time techniques+ handout  physiological quieting   Sleep suggestions   Emptying strategies  Bladder retraining  biofeedback -  external sensors were placed and leads were attached  Resting tone  Tonic  Phasic  [place]  With coordinated breathing    [place]-  diaphragmatic breathing   x10  diaphragmatic breathing +abdominal bracing x10    diaphragmatic breathing + abdominal bracing + kegel x10    kegel +  *Sit<>stand  *March *Stand      Biofeedback with constant interpretation of results.    Kegel +  iso hip add  10\" x10   Kegel +  Resisted   ER RTB  x10   Kegel + clamshell RTB x10 R/L    Kegel +  U/LE lift  x10     Kegel + articulating bridge x10 + iso hip add  Kegel with plie/ER resisted RTB x10   Issued HEP for above with latex free RTB     Kegel +  Resisted   ER RTB  x10   Kegel +  U/LE lift  x10     Kegel + articulating bridge x10 + iso hip add    Sit on ball-  quick flicks [id number]  pelvic brace + U/LE lift- x10-HEP  Standing-  Kegel + iso hip add + squat w ball behind back x10    pelvic brace + lifting 2# waist<>1st shelf x10    diaphragmatic breathing x10  diaphragmatic breathing + abdominal bracing x10  diaphragmatic breathing +abdominal bracing +kegel x10    pelvic brace x10 + handout Sit on ball-  x2 min  quick flicks M72 NuStep 5min L5   Shuttle- DLP  25# with pelvic brace + iso hip adduction x30 reps     NuStep 5min L5   Shuttle- DLP  25# with pelvic brace + iso hip adduction x30 reps  Til board f/b-pelvic brace x30  ASU airex w pelvic brace x15      HEP: Kegel 10 repetitions 3x/day, 10 sec on/10 sec off, 2 sec on 2-4 sec off , see above for additions    Charges: TEx2, NM Re-edx1       Total Timed Treatment: 45 min  Total Treatment Time: 45 min    Initial evaluation: PATIENT SUMMARY   Narcisa Moses is a 74year old female  who presents to therapy today with complaints of leakage with coughing, sneezing, lifting, laughing hard and some times with urgency that she has had for years and has worsened over the past month. Current symptoms include: urgency/frequency, incomplete emptying and leakage    PFDI-20: 86.46/300; Impairment= 28.8 %    Chrissy Turner describes prior level of function less urgency and leakage. Pt goals include improve strength of pelvic floor muscles to decrease/resolve urgency and leakage. Past medical history was reviewed with Chrissy Turner. Significant findings include  has a past medical history of Allergic rhinitis, Belching, Flatulence/gas pain/belching, Hemorrhoids, History of depression (04/09/2016), Osteopenia, Tubular adenoma (12/07)  -sees chiropractor for neck and back    Precautions:  None    URINARY HABITS  Types of symptoms: stress incontinence, urge incontinence and nocturia  Leaking occurs: NEGIN, UI  Episodes of Leakage: 3 times per week  Amount of leakage: minimal  Nocturia: 2  Fluid Intake: some water  Bladder irritants: diet dr pepper 6 cans  Urine Stop test: difficulty  Empty bladder just in case: yes  Do you ever leak urine without knowing it?  yes    BOWEL HABITS  Types of symptoms: other some times strains   Frequency of bowel movements: daily , some times 2x/day  Stool consistency: [place] Stool Scale: 2-4  Do you strain with defecation: Yes -  Some     Range Of Motion  Lumbar AROM screen: wfl  LE AROM screen: grossly WNL except: B hip IR 25%-no c/o pain    Strength (MMT) 5/5 AMADEO LE except B hip abduction and extension 4-/5  Transverse Abdominis: 1/5    Internal Examination   Pelvic Floor Muscle strength: (PERF= Power/Endurance/Reps/Fast) MMT: 2/4/4/5  Accessory Muscle Use: abdominals

## 2022-02-17 ENCOUNTER — OFFICE VISIT (OUTPATIENT)
Dept: PHYSICAL THERAPY | Facility: HOSPITAL | Age: 67
End: 2022-02-17
Attending: FAMILY MEDICINE
Payer: COMMERCIAL

## 2022-02-17 DIAGNOSIS — N39.46 MIXED INCONTINENCE URGE AND STRESS (MALE)(FEMALE): ICD-10-CM

## 2022-02-17 PROCEDURE — 97112 NEUROMUSCULAR REEDUCATION: CPT

## 2022-02-17 PROCEDURE — 97110 THERAPEUTIC EXERCISES: CPT

## 2022-02-17 NOTE — PROGRESS NOTES
Dx: Mixed incontinence urge and stress (male)(female) (N39.46)         Insurance (Authorized # of Visits):  No auth, no limit           Authorizing Physician: Dr. Jordyn Griffin MD visit: none scheduled  Fall Risk: standard         Precautions: 2 level anterior cervical fusion 20 years ago          Subjective: Having no leakage this past week. Doing strategies and HEP. Nocturia 0-1x (was:1-2x). Pain: 0/10      Objective: Tx flow sheet  1/27/22  Biofeedback: Recruitment good, holding ability poor, derecruitment good, baseline between contractions . 05-2.0Ua, baseline resting average . 05Ua (normal 2.0Ua)        Assessment: Good tolerance to therapy with improved strength and coordination of pelvic floor muscles via contraction of fascially connected muscles with simulated activities of daily living . Pt able to progress abdominal strengthening to Sahrmann level 2. Pt did have slight L lumbar pain when attempted Sahrmann Level 3 that resolved with stretching. Pt required verbal and manual cueing throughout treatment for correct execution of exercises. Patient instructed to: preform pelvic brace with activities of daily living and to add Sahrmann Level 1 to HEP x20 ea LE. Goals:   Goals: (to be met in 10 visits)-progressing   1. Independent in HEP and progression with improved understanding of bladder/bowel health, bladder retraining and long-term management and more neutral posture. 2. Patient will demonstrate improved bladder voiding habits to voiding every 2 hours without urinary leakage. -MET  3. Patient will demonstrate improve PFM isolation, coordination and strength to 3/8/8/8 so she is able to reduce urinary urge and prevent leakage during ADL's.  4. Nocturnal voiding 1x/night for improved sleep. -MET  5. PFM contraction before increase intra-abdominal pressure. -MET  6.  Pt will have increased transverse abdominis muscle strength to 2/5 and hip strength to 4+/5 to assist with supporting pelvic floor muscles. Plan: Continue plan of care as pt tolerates with focus on pelvic floor muscle strengthening and coordination. Next visit: progress pelvic floor muscle contraction with fascially connected mm  -progress with activities of daily living , continue  Date: 1/20/2022  TX#: 2/10 Date:       1/27/2022   TX#: 3/ Date:     2/3/2022             TX#: 4/ Date:           2/10/2022       TX#: 5/ Date: 2/17/2022   Tx#: 6/   PFM NM  Re-ed   Bladder diary  Bladder fitness + handout  Night time techniques+ handout  physiological quieting   Sleep suggestions   Emptying strategies  Bladder retraining  biofeedback -  external sensors were placed and leads were attached  Resting tone  Tonic  Phasic  [place]  With coordinated breathing    [place]-  diaphragmatic breathing   x10  diaphragmatic breathing +abdominal bracing x10    diaphragmatic breathing + abdominal bracing + kegel x10    kegel +  *Sit<>stand  *March  *Stand      Biofeedback with constant interpretation of results.    Kegel +  iso hip add  10\" x10   Kegel +  Resisted   ER RTB  x10   Kegel + clamshell RTB x10 R/L    Kegel +  U/LE lift  x10     Kegel + articulating bridge x10 + iso hip add  Kegel with plie/ER resisted RTB x10   Issued HEP for above with latex free RTB     Kegel +  Resisted   ER RTB  x10   Kegel +  U/LE lift  x10     Kegel + articulating bridge x10 + iso hip add    Sit on ball-  quick flicks [id number]  pelvic brace + U/LE lift- x10-HEP  Standing-  Kegel + iso hip add + squat w ball behind back x10    pelvic brace + lifting 2# waist<>1st shelf x10 Sahrmann:  Level 1:  Heel slides R/L x20  Level 2  Hookly>kick out R/L x20  Level 3-3x    LTR x20    BKC x1 min    4 pt cat/camel x10    4 pt U/LE lift x5    Shuttle- DLP  25# with pelvic brace + iso hip adduction x30 reps with coordinated breathing 0    Tilt board F/B x30 w kegel with coordinated breathing     ASU 6 inch w kegel x10    Standing on sand dune quick flicks M40  with coordinated breathing diaphragmatic breathing x10  diaphragmatic breathing + abdominal bracing x10  diaphragmatic breathing +abdominal bracing +kegel x10    pelvic brace x10 + handout Sit on ball-  x2 min  quick flicks N77 NuStep 5min L5   Shuttle- DLP  25# with pelvic brace + iso hip adduction x30 reps     NuStep 5min L5   Shuttle- DLP  25# with pelvic brace + iso hip adduction x30 reps  Til board f/b-pelvic brace x30  ASU airex w pelvic brace x15   NuStep 5min L5    HEP: Kegel 10 repetitions 3x/day, 10 sec on/10 sec off, 2 sec on 2-4 sec off , see above for additions    Charges: TEx2, NM Re-edx1       Total Timed Treatment: 45 min  Total Treatment Time: 45 min    Initial evaluation:   PATIENT SUMMARY   Lei Hart is a 74year old female  who presents to therapy today with complaints of leakage with coughing, sneezing, lifting, laughing hard and some times with urgency that she has had for years and has worsened over the past month. Current symptoms include: urgency/frequency, incomplete emptying and leakage    PFDI-20: 86.46/300; Impairment= 28.8 %    Vera Gonzalez describes prior level of function less urgency and leakage. Pt goals include improve strength of pelvic floor muscles to decrease/resolve urgency and leakage. Past medical history was reviewed with Vera Gonzalez. Significant findings include  has a past medical history of Allergic rhinitis, Belching, Flatulence/gas pain/belching, Hemorrhoids, History of depression (04/09/2016), Osteopenia, Tubular adenoma (12/07)  -sees chiropractor for neck and back    Precautions:  None    URINARY HABITS  Types of symptoms: stress incontinence, urge incontinence and nocturia  Leaking occurs: NEGIN, UI  Episodes of Leakage: 3 times per week  Amount of leakage: minimal  Nocturia: 2  Fluid Intake: some water  Bladder irritants: diet dr pepper 6 cans  Urine Stop test: difficulty  Empty bladder just in case: yes  Do you ever leak urine without knowing it?  yes    BOWEL HABITS  Types of symptoms: other some times strains   Frequency of bowel movements: daily , some times 2x/day  Stool consistency: [place] Stool Scale: 2-4  Do you strain with defecation: Yes -  Some     Range Of Motion  Lumbar AROM screen: wfl  LE AROM screen: grossly WNL except: B hip IR 25%-no c/o pain    Strength (MMT) 5/5 AMADEO LE except B hip abduction and extension 4-/5  Transverse Abdominis: 1/5    Internal Examination   Pelvic Floor Muscle strength: (PERF= Power/Endurance/Reps/Fast) MMT: 2/4/4/5  Accessory Muscle Use: abdominals

## 2022-02-24 ENCOUNTER — OFFICE VISIT (OUTPATIENT)
Dept: PHYSICAL THERAPY | Facility: HOSPITAL | Age: 67
End: 2022-02-24
Attending: FAMILY MEDICINE
Payer: COMMERCIAL

## 2022-02-24 DIAGNOSIS — N39.46 MIXED INCONTINENCE URGE AND STRESS (MALE)(FEMALE): ICD-10-CM

## 2022-02-24 PROCEDURE — 97110 THERAPEUTIC EXERCISES: CPT

## 2022-02-24 PROCEDURE — 97112 NEUROMUSCULAR REEDUCATION: CPT

## 2022-02-24 NOTE — PROGRESS NOTES
Dx: Mixed incontinence urge and stress (male)(female) (N39.46)         Insurance (Authorized # of Visits):  No auth, no limit           Authorizing Physician: Dr. Kenny Quezada  Next MD visit: none scheduled  Fall Risk: standard         Precautions: 2 level anterior cervical fusion 20 years ago          Subjective: Having no leakage this past week. Doing strategies and HEP. Nocturia 0-2x (was:1-2x)-may have drank more fluid after supper. Had some L sided back pain after last PT visit that resolved with ice pack. pelvic floor muscles feeling stronger. Pt goals include improve strength of pelvic floor muscles to decrease/resolve urgency and leakage    Pain: 0/10-no back or pelvic pain      Objective: Tx flow sheet  1/27/22  Biofeedback: Recruitment good, holding ability poor, derecruitment good, baseline between contractions . 05-2.0Ua, baseline resting average . 05Ua (normal 2.0Ua)        Assessment: Good tolerance to therapy with improved strength and coordination of pelvic floor muscles via contraction of fascially connected muscles with simulated activities of daily living . Pt able to progress pelvic floor muscle contraction/coordination with performing pelvic brace in various planes of movement. Pt required verbal and manual cueing throughout treatment for correct execution of exercises, especially when sitting on the ball and to avoid trunk flexion. Pt tended to have increased pelvic tilt which improved with verbal and manual cueing. Patient instructed to: preform pelvic brace with activities of daily living. Goals:   Goals: (to be met in 10 visits)-progressing   1. Independent in HEP and progression with improved understanding of bladder/bowel health, bladder retraining and long-term management and more neutral posture. 2. Patient will demonstrate improved bladder voiding habits to voiding every 2 hours without urinary leakage. -MET  3.  Patient will demonstrate improve PFM isolation, coordination and strength to 3/8/8/8 so she is able to reduce urinary urge and prevent leakage during ADL's.  4. Nocturnal voiding 1x/night for improved sleep. -MET  5. PFM contraction before increase intra-abdominal pressure. -MET  6. Pt will have increased transverse abdominis muscle strength to 2/5 and hip strength to 4+/5 to assist with supporting pelvic floor muscles. Plan: Continue plan of care as pt tolerates with focus on pelvic floor muscle strengthening and coordination. Next visit: progress pelvic floor muscle contraction with fascially connected mm  -progress with activities of daily living , continue  Date: 1/20/2022  TX#: 2/10 Date:       1/27/2022   TX#: 3/ Date:     2/3/2022             TX#: 4/ Date:           2/10/2022       TX#: 5/ Date: 2/17/2022   Tx#: 6/ Date: 2/24/2022   Tx# 7/ Date: Tx# 8/   PFM NM  Re-ed   Bladder diary  Bladder fitness + handout  Night time techniques+ handout  physiological quieting   Sleep suggestions   Emptying strategies  Bladder retraining  biofeedback -  external sensors were placed and leads were attached  Resting tone  Tonic  Phasic  [place]  With coordinated breathing    [place]-  diaphragmatic breathing   x10  diaphragmatic breathing +abdominal bracing x10    diaphragmatic breathing + abdominal bracing + kegel x10    kegel +  *Sit<>stand  *March  *Stand      Biofeedback with constant interpretation of results.    Kegel +  iso hip add  10\" x10   Kegel +  Resisted   ER RTB  x10   Kegel + clamshell RTB x10 R/L    Kegel +  U/LE lift  x10     Kegel + articulating bridge x10 + iso hip add  Kegel with plie/ER resisted RTB x10   Issued HEP for above with latex free RTB     Kegel +  Resisted   ER RTB  x10   Kegel +  U/LE lift  x10     Kegel + articulating bridge x10 + iso hip add    Sit on ball-  quick flicks P50-BKA  pelvic brace + U/LE lift- x10-HEP  Standing-  Kegel + iso hip add + squat w ball behind back x10    pelvic brace + lifting 2# waist<>1st shelf x10 Sahrmann:  Level 1:  Heel slides R/L x20  Level 2  Hookly>kick out R/L x20  Level 3-3x    LTR x20    BKC x1 min    4 pt cat/camel x10    4 pt U/LE lift x5    Shuttle- DLP  25# with pelvic brace + iso hip adduction x30 reps with coordinated breathing     Tilt board F/B x30 w kegel with coordinated breathing     ASU 6 inch w kegel x10    Standing on sand dune quick flicks S97  with coordinated breathing            PFM NM  Re-ed   Strategies for:  Nocturia  emptying    Basic breath    Sahrmann:  Level 1: R/L x20 ea    Diaphragmatic breathing + abdominal bracing + kegel +    Isometric hip add x10    Isometric hip add + bridge x10    U/LE lift x10    HSS x30 sec ea R/L        Sit on the ball-  diaphragmatic breathing + abdominal bracing + kegel +    -U/LE lift x10      kegel + lift overhead 1# x10  with coordinated breathing            diaphragmatic breathing x10  diaphragmatic breathing + abdominal bracing x10  diaphragmatic breathing +abdominal bracing +kegel x10    pelvic brace x10 + handout Sit on ball-  x2 min  quick flicks S21 NuStep 5min L5   Shuttle- DLP  25# with pelvic brace + iso hip adduction x30 reps     NuStep 5min L5   Shuttle- DLP  25# with pelvic brace + iso hip adduction x30 reps  Til board f/b-pelvic brace x30  ASU airex w pelvic brace x15   NuStep 5min L5  NuStep 5min L5       HEP: Kegel 10 repetitions 3x/day, 10 sec on/10 sec off, 2 sec on 2-4 sec off , see above for additions    Charges: TEx2, NM Re-edx1       Total Timed Treatment: 45 min  Total Treatment Time: 45 min    Initial evaluation:   PATIENT SUMMARY   Harper Renae is a 74year old female  who presents to therapy today with complaints of leakage with coughing, sneezing, lifting, laughing hard and some times with urgency that she has had for years and has worsened over the past month. Current symptoms include: urgency/frequency, incomplete emptying and leakage    PFDI-20: 86.46/300;  Impairment= 28.8 %    Chase Tadeo describes prior level of function less urgency and leakage. .  Past medical history was reviewed with Jai Ann. Significant findings include  has a past medical history of Allergic rhinitis, Belching, Flatulence/gas pain/belching, Hemorrhoids, History of depression (04/09/2016), Osteopenia, Tubular adenoma (12/07)  -sees chiropractor for neck and back    Precautions:  None    URINARY HABITS  Types of symptoms: stress incontinence, urge incontinence and nocturia  Leaking occurs: NEGIN, UI  Episodes of Leakage: 3 times per week  Amount of leakage: minimal  Nocturia: 2  Fluid Intake: some water  Bladder irritants: diet dr pepper 6 cans  Urine Stop test: difficulty  Empty bladder just in case: yes  Do you ever leak urine without knowing it?  yes    BOWEL HABITS  Types of symptoms: other some times strains   Frequency of bowel movements: daily , some times 2x/day  Stool consistency: [place] Stool Scale: 2-4  Do you strain with defecation: Yes -  Some     Range Of Motion  Lumbar AROM screen: wfl  LE AROM screen: grossly WNL except: B hip IR 25%-no c/o pain    Strength (MMT) 5/5 AMADEO LE except B hip abduction and extension 4-/5  Transverse Abdominis: 1/5    Internal Examination   Pelvic Floor Muscle strength: (PERF= Power/Endurance/Reps/Fast) MMT: 2/4/4/5  Accessory Muscle Use: abdominals

## 2022-03-03 ENCOUNTER — APPOINTMENT (OUTPATIENT)
Dept: PHYSICAL THERAPY | Facility: HOSPITAL | Age: 67
End: 2022-03-03
Attending: FAMILY MEDICINE
Payer: COMMERCIAL

## 2022-03-10 ENCOUNTER — APPOINTMENT (OUTPATIENT)
Dept: PHYSICAL THERAPY | Facility: HOSPITAL | Age: 67
End: 2022-03-10
Attending: FAMILY MEDICINE
Payer: COMMERCIAL

## 2022-03-14 ENCOUNTER — OFFICE VISIT (OUTPATIENT)
Dept: PHYSICAL THERAPY | Facility: HOSPITAL | Age: 67
End: 2022-03-14
Attending: FAMILY MEDICINE
Payer: COMMERCIAL

## 2022-03-14 PROCEDURE — 97112 NEUROMUSCULAR REEDUCATION: CPT

## 2022-03-14 PROCEDURE — 97110 THERAPEUTIC EXERCISES: CPT

## 2022-03-14 NOTE — PROGRESS NOTES
Dx: Mixed incontinence urge and stress (male)(female) (N39.46)         Insurance (Authorized # of Visits):  No auth, no limit           Authorizing Physician: Dr. Nuno Pereira  Next MD visit: none scheduled  Fall Risk: standard         Precautions: 2 level anterior cervical fusion 20 years ago          Subjective: Having no leakage this past week. Doing strategies and HEP. Nocturia 0-2x (was:1-2x)-may have drank more fluid after supper. pelvic floor muscles feeling stronger. L sciatic pain with long periods of sitting. Some times voids \"JIC\" when it's cold outside. Pt goals include improve strength of pelvic floor muscles to decrease/resolve urgency and leakage    Pain: 0/10-no back or pelvic pain      Objective: Tx flow sheet  1/27/22  Biofeedback: Recruitment good, holding ability poor, derecruitment good, baseline between contractions . 05-2.0Ua, baseline resting average . 05Ua (normal 2.0Ua)        Assessment: Good tolerance to therapy with improved strength and coordination of pelvic floor muscles via contraction of fascially connected muscles with simulated activities of daily living . Pt able to progress pelvic floor muscle contraction/coordination with performing pelvic brace in various planes of movement. Pt required verbal and manual cueing throughout treatment for correct execution of exercises, especially when sitting on the ball and to avoid trunk flexion. Pt tended to have increased pelvic tilt which improved with verbal and manual cueing. Patient instructed to: perform pelvic brace with activities of daily living. Added: hip flexor stretching for reduction of sciatic symptoms. Added: physiological quieting concept prior to going outside to walk when it's cold outside. Goals partially met. Goals:   Goals: (to be met in 10 visits)-progressing   1.  Independent in HEP and progression with improved understanding of bladder/bowel health, bladder retraining and long-term management and more neutral posture. 2. Patient will demonstrate improved bladder voiding habits to voiding every 2 hours without urinary leakage. -MET  3. Patient will demonstrate improve PFM isolation, coordination and strength to 3/8/8/8 so she is able to reduce urinary urge and prevent leakage during ADL's.  4. Nocturnal voiding 1x/night for improved sleep. -MET  5. PFM contraction before increase intra-abdominal pressure. -MET  6. Pt will have increased transverse abdominis muscle strength to 2/5 and hip strength to 4+/5 to assist with supporting pelvic floor muscles. Plan: Continue plan of care as pt tolerates with focus on pelvic floor muscle strengthening and coordination. Next visit: progress pelvic floor muscle contraction with fascially connected mm  -progress with activities of daily living , continue, Next visit: d/c  Date: 1/20/2022  TX#: 2/10 Date:       1/27/2022   TX#: 3/ Date:     2/3/2022             TX#: 4/ Date:           2/10/2022       TX#: 5/ Date: 2/17/2022   Tx#: 6/ Date: 2/24/2022   Tx# 7/ Date: 3/14/2022   Tx# 8/  Partial re-assessment   PFM NM  Re-ed   Bladder diary  Bladder fitness + handout  Night time techniques+ handout  physiological quieting   Sleep suggestions   Emptying strategies  Bladder retraining  biofeedback -  external sensors were placed and leads were attached  Resting tone  Tonic  Phasic  [place]  With coordinated breathing    [place]-  diaphragmatic breathing   x10  diaphragmatic breathing +abdominal bracing x10    diaphragmatic breathing + abdominal bracing + kegel x10    kegel +  *Sit<>stand  *March *Stand      Biofeedback with constant interpretation of results.    Kegel +  iso hip add  10\" x10   Kegel +  Resisted   ER RTB  x10   Kegel + clamshell RTB x10 R/L    Kegel +  U/LE lift  x10     Kegel + articulating bridge x10 + iso hip add  Kegel with plie/ER resisted RTB x10   Issued HEP for above with latex free RTB     Kegel +  Resisted   ER RTB  x10   Kegel +  U/LE lift  x10     Kegel + articulating bridge x10 + iso hip add    Sit on ball-  quick flicks H43-XFK  pelvic brace + U/LE lift- x10-HEP  Standing-  Kegel + iso hip add + squat w ball behind back x10    pelvic brace + lifting 2# waist<>1st shelf x10 Sahrmann:  Level 1:  Heel slides R/L x20  Level 2  Hookly>kick out R/L x20  Level 3-3x    LTR x20    BKC x1 min    4 pt cat/camel x10    4 pt U/LE lift x5    Shuttle- DLP  25# with pelvic brace + iso hip adduction x30 reps with coordinated breathing     Tilt board F/B x30 w kegel with coordinated breathing     ASU 6 inch w kegel x10    Standing on sand dune quick flicks H93  with coordinated breathing            PFM NM  Re-ed   Strategies for:  Nocturia  emptying    Basic breath    [place]:  Level 1: R/L x20 ea    Diaphragmatic breathing + abdominal bracing + kegel +    Isometric hip add x10    Isometric hip add + bridge x10    U/LE lift x10    HSS x30 sec ea R/L        Sit on the ball-  diaphragmatic breathing + abdominal bracing + kegel +    -U/LE lift x10      kegel + lift overhead 1# x10  with coordinated breathing         PFM NM  Re-ed   -  With NM Re-ed throughout for improved bladder habits  physiological quieting     Basic breath    [place]:  Level 1: R/L x20 ea    pelvic brace + alt [place] x20    Kegel + clamshell   RTB 2x10 R/L -HEP supine position if having any sciatic symptoms    Prone-  Knee flexion w green strap on/off x20 R/L, sustained x30 min -HEP    Reviewed HEP   diaphragmatic breathing x10  diaphragmatic breathing + abdominal bracing x10  diaphragmatic breathing +abdominal bracing +kegel x10    pelvic brace x10 + handout Sit on ball-  x2 min  quick flicks T49 NuStep 5min L5   Shuttle- DLP  25# with pelvic brace + iso hip adduction x30 reps     NuStep 5min L5   Shuttle- DLP  25# with pelvic brace + iso hip adduction x30 reps  Til board f/b-pelvic brace x30  ASU airex w pelvic brace x15   NuStep 5min L5  NuStep 5min L5    -    HEP: Kegel 10 repetitions 3x/day, 10 sec on/10 sec off, 2 sec on 2-4 sec off , see above for additions    Charges: TEx2, NM Re-edx1       Total Timed Treatment: 45 min  Total Treatment Time: 45 min        PFDI-20: 86.46/300; Impairment= 28.8 %    3/14/2022     Glory Butt is a 74year old female  who presents to therapy today with complaints of leakage with coughing, sneezing, lifting, laughing hard and some times with urgency that she has had for years and has worsened over the past month. Current symptoms include: urgency/frequency, incomplete emptying and leakage    URINARY HABITS  Leaking occurs: NEGIN, UI-  No leakage  Episodes of Leakage: 0x (was:3 times per week)  Amount of leakage: none (was:minimal)  Nocturia: 1-2x (was:2x)-having sleep disturbance and waking up at 3 pm  Fluid Intake: attempting to consume 60 oz, significant decrease in diet pop (was:some water)  Empty bladder just in case: not anymore (was:yes)  Do you ever leak urine without knowing it?  No (was:yes)  Voiding: every 2-3 hours    BOWEL HABITS  Types of symptoms: other some times strains -not often  Frequency of bowel movements: daily , some times 2x/day  Stool consistency: [place] Stool Scale: 2-4  Do you strain with defecation: Yes -  Some -not often    Range Of Motion  Lumbar AROM screen: wfl  LE AROM screen: grossly WNL except: B hip IR 25%-no c/o pain    Strength (MMT) 5/5 AMADEO LE except B hip abduction and extension 4/5 (was:4-/5)  Transverse Abdominis: 2/5 (was:1/5)    Internal Examination   Pelvic Floor Muscle strength: (PERF= Power/Endurance/Reps/Fast) MMT: 2/4/4/5-not re-assessed  Accessory Muscle Use: abdominals

## 2022-03-17 ENCOUNTER — APPOINTMENT (OUTPATIENT)
Dept: PHYSICAL THERAPY | Facility: HOSPITAL | Age: 67
End: 2022-03-17
Attending: FAMILY MEDICINE
Payer: COMMERCIAL

## 2022-03-24 ENCOUNTER — OFFICE VISIT (OUTPATIENT)
Dept: PHYSICAL THERAPY | Facility: HOSPITAL | Age: 67
End: 2022-03-24
Attending: FAMILY MEDICINE
Payer: COMMERCIAL

## 2022-03-24 PROCEDURE — 97110 THERAPEUTIC EXERCISES: CPT

## 2022-03-24 PROCEDURE — 97112 NEUROMUSCULAR REEDUCATION: CPT

## 2022-03-24 NOTE — PROGRESS NOTES
Discharge Summary  Pt has attended 9 visits in Physical Therapy. Dx: Mixed incontinence urge and stress (male)(female) (N39.46)         Insurance (Authorized # of Visits):  No auth, no limit           Authorizing Physician: Dr. Hernandez Six  Next MD visit: none scheduled  Fall Risk: standard         Precautions: 2 level anterior cervical fusion 20 years ago          Subjective: Having no leakage over the past couple of weeks. Pelvic floor muscles feel stronger. Doing strategies and HEP. Nocturia 0-1x (was:1-2x). L sciatic pain has been better this week and doing psoas/hip flexor HEP. Less \"JIC\" now. .  Current symptoms include: urgency/frequency, incomplete emptying and leakage-ALL RESOLVED    Pt goals include improve strength of pelvic floor muscles to decrease/resolve urgency and leakage-MET    PFDI-20:10/4/300 (was: 86.46/300); Impairment= 3.5% (was:28.8 %)    Pain: 0/10-no back or pelvic pain      Objective: Tx flow sheet      URINARY HABITS  Leaking occurs: NEGIN, UI-  No leakage  Episodes of Leakage: 0x (was:3 times per week)  Amount of leakage: none (was:minimal)  Nocturia: 1-2x (was:2x)-having sleep disturbance and waking up at 3 pm  Fluid Intake: attempting to consume 60 oz, significant decrease in diet pop (was:some water)  Empty bladder just in case: not anymore (was:yes)  Do you ever leak urine without knowing it?  No (was:yes)  Voiding: every 2-3 hours    BOWEL HABITS  Types of symptoms: other some times strains -not often  Frequency of bowel movements: daily , some times 2x/day  Stool consistency: [place] Stool Scale: 2-4  Do you strain with defecation: Yes -  Some -not often    Range Of Motion  Lumbar AROM screen: wfl  LE AROM screen: grossly WNL except: B hip IR 25%-no c/o pain    Strength (MMT) 5/5 AMADEO LE except B hip abduction and extension 4+/5 (was:4-/5)  Transverse Abdominis: 2/5 (was:1/5)    Internal Examination   Pelvic Floor Muscle strength: (PERF= Power/Endurance/Reps/Fast) MMT: 4/10/10/10 (was:2/4/4/5)  Accessory Muscle Use: abdominals-able to isolate     1/27/22  Biofeedback: Recruitment good, holding ability poor, derecruitment good, baseline between contractions . 05-2.0Ua, baseline resting average . 05Ua (normal 2.0Ua)        Assessment: Pt has made significant improvement in physical therapy with increased pelvic floor muscles strength and coordination and function with resolution of leakage, improved bowel and bladder habits and increased hip and core strength. All goals met. Pt motivated to continue HEP. Goals:   Goals: (to be met in 10 visits)-  1. Independent in HEP and progression with improved understanding of bladder/bowel health, bladder retraining and long-term management and more neutral posture. -MET   2. Patient will demonstrate improved bladder voiding habits to voiding every 2 hours without urinary leakage. -MET  3. Patient will demonstrate improve PFM isolation, coordination and strength to 3/8/8/8 so she is able to reduce urinary urge and prevent leakage during ADL's.-MET  4. Nocturnal voiding 1x/night for improved sleep. -MET  5. PFM contraction before increase intra-abdominal pressure. -MET  6. Pt will have increased transverse abdominis muscle strength to 2/5 and hip strength to 4+/5 to assist with supporting pelvic floor muscles. -MET    Plan: Pt considered discharged from physical therapy. Pt instructed to call clinic if he/she has any further questions and to continue home exercise program.     Patient/Family/Caregiver was advised of these findings, precautions, and treatment options and has agreed to actively participate in planning and for this course of care. Thank you for your referral. If you have any questions, please contact me at Dept: [phone].     Sincerely,  Electronically signed by therapist: Baldomero Cruz, PT         Date: 1/20/2022  TX#: 2/10 Date:       1/27/2022   TX#: 3/ Date:     2/3/2022             TX#: 4/ Date:           2/10/2022 TX#: 5/ Date: 2/17/2022   Tx#: 6/ Date: 2/24/2022   Tx# 7/ Date: 3/14/2022   Tx# 8/  Partial re-assessment Date:3/24/2022   Tx# 9/  Progress note     PFM NM  Re-ed   Bladder diary  Bladder fitness + handout  Night time techniques+ handout  physiological quieting   Sleep suggestions   Emptying strategies  Bladder retraining  biofeedback -  external sensors were placed and leads were attached  Resting tone  Tonic  Phasic  [place]  With coordinated breathing    [place]-  diaphragmatic breathing   x10  diaphragmatic breathing +abdominal bracing x10    diaphragmatic breathing + abdominal bracing + kegel x10    kegel +  *Sit<>stand  *March *Stand      Biofeedback with constant interpretation of results.    Kegel +  iso hip add  10\" x10   Kegel +  Resisted   ER RTB  x10   Kegel + clamshell RTB x10 R/L    Kegel +  U/LE lift  x10     Kegel + articulating bridge x10 + iso hip add  Kegel with plie/ER resisted RTB x10   Issued HEP for above with latex free RTB     Kegel +  Resisted   ER RTB  x10   Kegel +  U/LE lift  x10     Kegel + articulating bridge x10 + iso hip add    Sit on ball-  quick flicks [id number]  pelvic brace + U/LE lift- x10-HEP  Standing-  Kegel + iso hip add + squat w ball behind back x10    pelvic brace + lifting 2# waist<>1st shelf x10 [place]ann:  Level 1:  Heel slides R/L x20  Level 2  Hookly>kick out R/L x20  Level 3-3x    LTR x20    BKC x1 min    4 pt cat/camel x10    4 pt U/LE lift x5    Shuttle- DLP  25# with pelvic brace + iso hip adduction x30 reps with coordinated breathing     Tilt board F/B x30 w kegel with coordinated breathing     ASU 6 inch w kegel x10    Standing on sand dune quick flicks T38  with coordinated breathing            PFM NM  Re-ed   Strategies for:  Nocturia  emptying    Basic breath    [place]:  Level 1: R/L x20 ea    Diaphragmatic breathing + abdominal bracing + kegel +    Isometric hip add x10    Isometric hip add + bridge x10    U/LE lift x10    HSS x30 sec ea R/L        Sit on the ball-  diaphragmatic breathing + abdominal bracing + kegel +    -U/LE lift x10      kegel + lift overhead 1# x10  with coordinated breathing         PFM NM  Re-ed   -  With NM Re-ed throughout for improved bladder habits  physiological quieting     Basic breath    [place]:  Level 1: R/L x20 ea    pelvic brace + alt march x20    Kegel + clamshell   RTB 2x10 R/L -HEP supine position if having any sciatic symptoms    Prone-  Knee flexion w green strap on/off x20 R/L, sustained x30 min -HEP    Reviewed HEP PFM NM  Re-ed   bladder habits/retraining/voiding  physiological quieting  urge incontinence strategies           Prone-  Knee flexion w green strap on/off x20 R/L, sustained x30 min 25 degree ext    YSABEL x10-HEP    Reviewed HEP for pelvic floor muscles     Internal training and re-assessment of pelvic floor muscles     diaphragmatic breathing x10  diaphragmatic breathing + abdominal bracing x10  diaphragmatic breathing +abdominal bracing +kegel x10    pelvic brace x10 + handout Sit on ball-  x2 min  quick flicks U03 NuStep 5min L5   Shuttle- DLP  25# with pelvic brace + iso hip adduction x30 reps     NuStep 5min L5   Shuttle- DLP  25# with pelvic brace + iso hip adduction x30 reps  Til board f/b-pelvic brace x30  ASU airex w pelvic brace x15   NuStep 5min L5  NuStep 5min L5    -      HEP: Kegel 10 repetitions 3x/day, 10 sec on/10 sec off, 2 sec on 2-4 sec off , see above for additions    Charges: TEx2, NM Re-edx1       Total Timed Treatment: 45 min  Total Treatment Time: 45 min

## 2022-11-08 ENCOUNTER — TELEPHONE (OUTPATIENT)
Dept: INTERNAL MEDICINE CLINIC | Facility: CLINIC | Age: 67
End: 2022-11-08

## 2022-11-08 DIAGNOSIS — Z13.0 SCREENING FOR DISORDER OF BLOOD AND BLOOD-FORMING ORGANS: ICD-10-CM

## 2022-11-08 DIAGNOSIS — Z13.220 SCREENING FOR LIPID DISORDERS: ICD-10-CM

## 2022-11-08 DIAGNOSIS — Z00.00 ANNUAL PHYSICAL EXAM: Primary | ICD-10-CM

## 2022-11-08 DIAGNOSIS — Z13.228 SCREENING FOR METABOLIC DISORDER: ICD-10-CM

## 2022-11-08 NOTE — TELEPHONE ENCOUNTER
Informed must fast no call back required.  Orders to [facility].    Future Appointments   Date Time Provider Nicolasa Dooley   12/7/2022  2:30 PM Mamta Johns DO [facility]

## 2022-11-30 LAB
ABSOLUTE BASOPHILS: 31 CELLS/UL (ref 0–200)
ABSOLUTE EOSINOPHILS: 150 CELLS/UL (ref 15–500)
ABSOLUTE LYMPHOCYTES: 1822 CELLS/UL (ref 850–3900)
ABSOLUTE MONOCYTES: 546 CELLS/UL (ref 200–950)
ABSOLUTE NEUTROPHILS: 1852 CELLS/UL (ref 1500–7800)
ALBUMIN/GLOBULIN RATIO: 1.6 (CALC) (ref 1–2.5)
ALBUMIN: 4.2 G/DL (ref 3.6–5.1)
ALKALINE PHOSPHATASE: 42 U/L (ref 37–153)
ALT: 16 U/L (ref 6–29)
AST: 26 U/L (ref 10–35)
BASOPHILS: 0.7 %
BILIRUBIN, TOTAL: 0.8 MG/DL (ref 0.2–1.2)
BUN/CREATININE RATIO: 45 (CALC) (ref 6–22)
BUN: 31 MG/DL (ref 7–25)
CALCIUM: 9.5 MG/DL (ref 8.6–10.4)
CARBON DIOXIDE: 30 MMOL/L (ref 20–32)
CHLORIDE: 104 MMOL/L (ref 98–110)
CHOL/HDLC RATIO: 2.3 (CALC)
CHOLESTEROL, TOTAL: 200 MG/DL
CREATININE: 0.69 MG/DL (ref 0.5–1.05)
EGFR: 94 ML/MIN/1.73M2
EOSINOPHILS: 3.4 %
GLOBULIN: 2.6 G/DL (CALC) (ref 1.9–3.7)
GLUCOSE: 88 MG/DL (ref 65–99)
HDL CHOLESTEROL: 86 MG/DL
HEMATOCRIT: 41.3 % (ref 35–45)
HEMOGLOBIN: 13.2 G/DL (ref 11.7–15.5)
LDL-CHOLESTEROL: 98 MG/DL (CALC)
LYMPHOCYTES: 41.4 %
MCH: 29.6 PG (ref 27–33)
MCHC: 32 G/DL (ref 32–36)
MCV: 92.6 FL (ref 80–100)
MONOCYTES: 12.4 %
MPV: 11.6 FL (ref 7.5–12.5)
NEUTROPHILS: 42.1 %
NON-HDL CHOLESTEROL: 114 MG/DL (CALC)
PLATELET COUNT: 220 THOUSAND/UL (ref 140–400)
POTASSIUM: 5 MMOL/L (ref 3.5–5.3)
PROTEIN, TOTAL: 6.8 G/DL (ref 6.1–8.1)
RDW: 12.7 % (ref 11–15)
RED BLOOD CELL COUNT: 4.46 MILLION/UL (ref 3.8–5.1)
SODIUM: 140 MMOL/L (ref 135–146)
TRIGLYCERIDES: 74 MG/DL
WHITE BLOOD CELL COUNT: 4.4 THOUSAND/UL (ref 3.8–10.8)

## 2022-12-07 ENCOUNTER — OFFICE VISIT (OUTPATIENT)
Dept: INTERNAL MEDICINE CLINIC | Facility: CLINIC | Age: 67
End: 2022-12-07
Payer: COMMERCIAL

## 2022-12-07 VITALS
OXYGEN SATURATION: 100 % | BODY MASS INDEX: 23.22 KG/M2 | DIASTOLIC BLOOD PRESSURE: 70 MMHG | SYSTOLIC BLOOD PRESSURE: 130 MMHG | HEART RATE: 103 BPM | HEIGHT: 64.84 IN | WEIGHT: 139.38 LBS

## 2022-12-07 DIAGNOSIS — Z78.0 MENOPAUSE: ICD-10-CM

## 2022-12-07 DIAGNOSIS — Z12.31 ENCOUNTER FOR SCREENING MAMMOGRAM FOR MALIGNANT NEOPLASM OF BREAST: ICD-10-CM

## 2022-12-07 DIAGNOSIS — Z00.00 ANNUAL PHYSICAL EXAM: Primary | ICD-10-CM

## 2022-12-07 DIAGNOSIS — M85.80 OSTEOPENIA, UNSPECIFIED LOCATION: ICD-10-CM

## 2022-12-07 PROCEDURE — 3078F DIAST BP <80 MM HG: CPT | Performed by: FAMILY MEDICINE

## 2022-12-07 PROCEDURE — 99397 PER PM REEVAL EST PAT 65+ YR: CPT | Performed by: FAMILY MEDICINE

## 2022-12-07 PROCEDURE — 3075F SYST BP GE 130 - 139MM HG: CPT | Performed by: FAMILY MEDICINE

## 2022-12-07 PROCEDURE — 3008F BODY MASS INDEX DOCD: CPT | Performed by: FAMILY MEDICINE

## 2022-12-16 RX ORDER — ALENDRONATE SODIUM 70 MG/1
TABLET ORAL
Qty: 12 TABLET | Refills: 3 | Status: SHIPPED | OUTPATIENT
Start: 2022-12-16

## 2022-12-16 NOTE — TELEPHONE ENCOUNTER
Last visit- 12/07/2022 cpe     Last refill- 12/27/2021 alendronate 70mg QTY12 3R    Last labs- 11/29/2022 lipid, cmp, cbc   Future Appointments   Date Time Provider Nicolasa Dooley   1/3/2023  2:20 PM [facility] [place]   1/3/2023  2:40  [address]
Patient Refused

## 2023-01-03 ENCOUNTER — HOSPITAL ENCOUNTER (OUTPATIENT)
Dept: BONE DENSITY | Age: 68
Discharge: HOME | End: 2023-01-03
Attending: FAMILY MEDICINE
Payer: COMMERCIAL

## 2023-01-03 ENCOUNTER — HOSPITAL ENCOUNTER (OUTPATIENT)
Dept: MAMMOGRAPHY | Age: 68
Discharge: HOME | End: 2023-01-03
Attending: FAMILY MEDICINE
Payer: COMMERCIAL

## 2023-01-03 DIAGNOSIS — Z78.0 MENOPAUSE: ICD-10-CM

## 2023-01-03 DIAGNOSIS — M85.80 OSTEOPENIA, UNSPECIFIED LOCATION: ICD-10-CM

## 2023-01-03 DIAGNOSIS — Z12.31 ENCOUNTER FOR SCREENING MAMMOGRAM FOR MALIGNANT NEOPLASM OF BREAST: ICD-10-CM

## 2023-01-03 PROCEDURE — 77063 BREAST TOMOSYNTHESIS BI: CPT | Performed by: FAMILY MEDICINE

## 2023-01-03 PROCEDURE — 77080 DXA BONE DENSITY AXIAL: CPT | Performed by: FAMILY MEDICINE

## 2023-01-03 PROCEDURE — 77067 SCR MAMMO BI INCL CAD: CPT | Performed by: FAMILY MEDICINE

## 2023-01-05 ENCOUNTER — TELEPHONE (OUTPATIENT)
Dept: INTERNAL MEDICINE CLINIC | Facility: CLINIC | Age: 68
End: 2023-01-05

## 2023-01-05 NOTE — TELEPHONE ENCOUNTER
See result note. Rehighlighted result note with note of called back. Consent: Written consent was obtained and risks were reviewed including but not limited to scarring, infection, bleeding, scabbing, incomplete removal, nerve damage and allergy to anesthesia. Destruction After The Procedure: No Wound Care: Vaseline Electrodesiccation And Curettage Text: The wound bed was treated with electrodesiccation and curettage after the biopsy was performed. Lab Facility: [id number] X Size Of Lesion In Cm: 0 Biopsy Method: Dermablade Type Of Destruction Used: Curettage Biopsy Type: H and E Hemostasis: Drysol Anesthesia Volume In Cc (Will Not Render If 0): 0.5 Anesthesia Type: 1% lidocaine with epinephrine Post-Care Instructions: I reviewed with the patient in detail post-care instructions. Patient is to keep the biopsy site dry overnight, and then clean wound with soap and water then apply vaseline once a day. Render Post-Care Instructions In Note?: yes Cryotherapy Text: The wound bed was treated with cryotherapy after the biopsy was performed. Curettage Text: The wound bed was treated with curettage after the biopsy was performed. Silver Nitrate Text: The wound bed was treated with silver nitrate after the biopsy was performed. Notification Instructions: Patient will be notified of biopsy results. However, patient instructed to call the office if not contacted within 2 weeks. Lab: [id number] Detail Level: Detailed Dressing: bandage Size Of Lesion In Cm: 0.8 Billing Type: Third-Party Bill Electrodesiccation Text: The wound bed was treated with electrodesiccation after the biopsy was performed.

## 2023-09-20 RX ORDER — ALENDRONATE SODIUM 70 MG/1
TABLET ORAL
Qty: 12 TABLET | Refills: 0 | Status: SHIPPED | OUTPATIENT
Start: 2023-09-20

## 2023-09-20 NOTE — TELEPHONE ENCOUNTER
Last OV 12/07/22    Last PE 12/07/2022   Last REFILL   ALENDRONATE 70 MG Oral Tab 12 tablet 3 12/16/2022     Last LABS LIPID CBC CMP 11/29/2022    No future appointments. Per PROTOCOL?   Osteoporosis Medication Protocol [id number]/20/2023 12:00 PM   Protocol Details DEXA scan within past 2 years    CMP within the past 12 months    Calcium level between 8.3 and 10.3    GFR level greater than 35    Appointment within past 12 or next 3 months     Please Advise

## 2023-10-30 ENCOUNTER — TELEPHONE (OUTPATIENT)
Dept: INTERNAL MEDICINE CLINIC | Facility: CLINIC | Age: 68
End: 2023-10-30

## 2023-10-30 DIAGNOSIS — Z00.00 ROUTINE GENERAL MEDICAL EXAMINATION AT A HEALTH CARE FACILITY: Primary | ICD-10-CM

## 2023-10-30 DIAGNOSIS — Z13.0 SCREENING FOR DISORDER OF BLOOD AND BLOOD-FORMING ORGANS: ICD-10-CM

## 2023-10-30 DIAGNOSIS — Z13.220 SCREENING FOR LIPID DISORDERS: ICD-10-CM

## 2023-10-30 DIAGNOSIS — Z13.228 SCREENING FOR METABOLIC DISORDER: ICD-10-CM

## 2023-10-30 NOTE — TELEPHONE ENCOUNTER
CPE   Future Appointments   Date Time Provider Nicolasa Soumya   12/11/2023  9:30 AM Juan Portal, DO [facility]   Informed must fast no call back required.  Orders to    [facility]

## 2023-11-29 RX ORDER — ALENDRONATE SODIUM 70 MG/1
70 TABLET ORAL WEEKLY
Qty: 12 TABLET | Refills: 0 | Status: SHIPPED | OUTPATIENT
Start: 2023-11-29

## 2023-11-29 NOTE — TELEPHONE ENCOUNTER
Requested Prescriptions     Pending Prescriptions Disp Refills    ALENDRONATE 70 MG Oral Tab [Pharmacy Med Name: ALENDRONATE  TAB 70MG] 12 tablet 0     Sig: TAKE 1 TABLET ONCE WEEKLY       LOV: 12-7-2022-physical    LAST CPE: 12-7-2022-physical    Last Labs: 11--cbc,cmp,lipid    Last Refill: 9---12 tabs with 0 refills     Your appointments       Date & Time Appointment Department [facility])    Dec 11, 2023  9:30 AM CST Physical - Established with Jose Harvey DO [address],[facility] [address] ([facility] IM/[facility])              [address] [place] [address]  [facility] IM/[facility] [place]  [address] [phone]

## 2023-12-06 LAB
ABSOLUTE BASOPHILS: 22 CELLS/UL (ref 0–200)
ABSOLUTE EOSINOPHILS: 148 CELLS/UL (ref 15–500)
ABSOLUTE LYMPHOCYTES: 1303 CELLS/UL (ref 850–3900)
ABSOLUTE MONOCYTES: 439 CELLS/UL (ref 200–950)
ABSOLUTE NEUTROPHILS: 1688 CELLS/UL (ref 1500–7800)
ALBUMIN/GLOBULIN RATIO: 1.6 (CALC) (ref 1–2.5)
ALBUMIN: 3.9 G/DL (ref 3.6–5.1)
ALKALINE PHOSPHATASE: 33 U/L (ref 37–153)
ALT: 18 U/L (ref 6–29)
AST: 26 U/L (ref 10–35)
BASOPHILS: 0.6 %
BILIRUBIN, TOTAL: 0.6 MG/DL (ref 0.2–1.2)
BUN/CREATININE RATIO: 47 (CALC) (ref 6–22)
BUN: 31 MG/DL (ref 7–25)
CALCIUM: 9.1 MG/DL (ref 8.6–10.4)
CARBON DIOXIDE: 30 MMOL/L (ref 20–32)
CHLORIDE: 105 MMOL/L (ref 98–110)
CHOL/HDLC RATIO: 2.5 (CALC)
CHOLESTEROL, TOTAL: 189 MG/DL
CREATININE: 0.66 MG/DL (ref 0.6–1)
EGFR: 94 ML/MIN/1.73M2
EOSINOPHILS: 4.1 %
GLOBULIN: 2.4 G/DL (CALC) (ref 1.9–3.7)
GLUCOSE: 96 MG/DL (ref 65–99)
HDL CHOLESTEROL: 77 MG/DL
HEMATOCRIT: 35.7 % (ref 35–45)
HEMOGLOBIN: 11.8 G/DL (ref 11.7–15.5)
LDL-CHOLESTEROL: 98 MG/DL (CALC)
LYMPHOCYTES: 36.2 %
MCH: 30.6 PG (ref 27–33)
MCHC: 33.1 G/DL (ref 32–36)
MCV: 92.5 FL (ref 80–100)
MONOCYTES: 12.2 %
MPV: 11.4 FL (ref 7.5–12.5)
NEUTROPHILS: 46.9 %
NON-HDL CHOLESTEROL: 112 MG/DL (CALC)
PLATELET COUNT: 215 THOUSAND/UL (ref 140–400)
POTASSIUM: 4.9 MMOL/L (ref 3.5–5.3)
PROTEIN, TOTAL: 6.3 G/DL (ref 6.1–8.1)
RDW: 12.3 % (ref 11–15)
RED BLOOD CELL COUNT: 3.86 MILLION/UL (ref 3.8–5.1)
SODIUM: 141 MMOL/L (ref 135–146)
TRIGLYCERIDES: 58 MG/DL
WHITE BLOOD CELL COUNT: 3.6 THOUSAND/UL (ref 3.8–10.8)

## 2023-12-11 ENCOUNTER — OFFICE VISIT (OUTPATIENT)
Dept: INTERNAL MEDICINE CLINIC | Facility: CLINIC | Age: 68
End: 2023-12-11
Payer: COMMERCIAL

## 2023-12-11 VITALS
HEIGHT: 64.5 IN | WEIGHT: 137.63 LBS | SYSTOLIC BLOOD PRESSURE: 102 MMHG | DIASTOLIC BLOOD PRESSURE: 70 MMHG | OXYGEN SATURATION: 99 % | HEART RATE: 101 BPM | BODY MASS INDEX: 23.21 KG/M2

## 2023-12-11 DIAGNOSIS — R53.83 FATIGUE, UNSPECIFIED TYPE: ICD-10-CM

## 2023-12-11 DIAGNOSIS — Z00.00 ANNUAL PHYSICAL EXAM: Primary | ICD-10-CM

## 2023-12-11 DIAGNOSIS — Z12.31 ENCOUNTER FOR SCREENING MAMMOGRAM FOR MALIGNANT NEOPLASM OF BREAST: ICD-10-CM

## 2023-12-11 DIAGNOSIS — D72.819 LEUKOPENIA, UNSPECIFIED TYPE: ICD-10-CM

## 2023-12-11 PROCEDURE — 99213 OFFICE O/P EST LOW 20 MIN: CPT | Performed by: FAMILY MEDICINE

## 2023-12-11 PROCEDURE — 99397 PER PM REEVAL EST PAT 65+ YR: CPT | Performed by: FAMILY MEDICINE

## 2023-12-11 PROCEDURE — 3074F SYST BP LT 130 MM HG: CPT | Performed by: FAMILY MEDICINE

## 2023-12-11 PROCEDURE — 3008F BODY MASS INDEX DOCD: CPT | Performed by: FAMILY MEDICINE

## 2023-12-11 PROCEDURE — 3078F DIAST BP <80 MM HG: CPT | Performed by: FAMILY MEDICINE

## 2023-12-11 NOTE — PATIENT INSTRUCTIONS
Get a total of 1200mg of calcium between diet and supplement daily. Do your labs (non-fasting) sometime after 12/19/23.

## 2023-12-11 NOTE — PROGRESS NOTES
Subjective:   Patient ID: Marky Garduno is a 77year old female. HPI Here for annual check-up. Patient is taking Alendronate with no issues. Walks for exercise. History/Other:   Past Medical History:   Diagnosis Date    Allergic rhinitis     Anxiety     Arthritis     Belching     Body piercing     Ears Pierced    Depression     Flatulence/gas pain/belching     Hemorrhoids     History of depression 2016    Sudden Death of     Osteoarthritis     Osteopenia     Tubular adenoma 2007    Wears glasses      Past Surgical History:   Procedure Laterality Date    COLONOSCOPY  2007    W/polypectomy  D Viviana James MD    D & C  1979          OTHER SURGICAL HISTORY  10/01/2001    Anterior disk fusion 2 levels     Social History     Socioeconomic History    Marital status:    Tobacco Use    Smoking status: Never    Smokeless tobacco: Never   Substance and Sexual Activity    Alcohol use: Yes     Alcohol/week: 1.0 standard drink of alcohol     Comment: 1-2 / month    Drug use: No   Other Topics Concern    Caffeine Concern No    Exercise Yes    Seat Belt Yes    Special Diet No    Stress Concern No    Weight Concern No     Family History   Adopted: Yes   Family history unknown: Yes       Review of Systems   Constitutional:  Negative for activity change, appetite change, fatigue and fever. HENT:  Negative for ear pain, hearing loss and rhinorrhea. Eyes:  Negative for visual disturbance. Respiratory:  Negative for cough and shortness of breath. Cardiovascular:  Negative for chest pain, palpitations and leg swelling. Gastrointestinal:  Negative for abdominal pain and constipation. Endocrine: Negative for polydipsia, polyphagia and polyuria. Genitourinary:  Negative for dysuria and frequency. Musculoskeletal:  Negative for arthralgias and joint swelling. Skin:  Negative for rash. Neurological:  Negative for dizziness, weakness, numbness and headaches. Hematological:  Negative for adenopathy. Does not bruise/bleed easily. Psychiatric/Behavioral:  Negative for dysphoric mood. The patient is not nervous/anxious. Current Outpatient Medications   Medication Sig Dispense Refill    alendronate 70 MG Oral Tab Take 1 tablet (70 mg total) by mouth once a week. 12 tablet 0    Multiple Vitamins-Minerals (MULTIVITAMIN ADULT OR) Take by mouth. Allergies:No Known Allergies    Objective:   Physical Exam  Vitals reviewed. Constitutional:       Appearance: Normal appearance. She is well-developed. HENT:      Head: Normocephalic and atraumatic. Right Ear: Tympanic membrane, ear canal and external ear normal.      Left Ear: Tympanic membrane, ear canal and external ear normal.      Mouth/Throat:      Pharynx: No posterior oropharyngeal erythema. Eyes:      Conjunctiva/sclera: Conjunctivae normal.   Cardiovascular:      Rate and Rhythm: Normal rate and regular rhythm. Heart sounds: Normal heart sounds. Pulmonary:      Effort: Pulmonary effort is normal.      Breath sounds: Normal breath sounds. Chest:   Breasts:     Right: No inverted nipple, mass, nipple discharge, skin change or tenderness. Left: No inverted nipple, mass, nipple discharge, skin change or tenderness. Genitourinary:     Labia:         Right: No rash or lesion. Left: No rash or lesion. Vagina: Normal. No vaginal discharge. Cervix: No cervical motion tenderness, discharge or friability. Musculoskeletal:      Cervical back: Normal range of motion and neck supple. Skin:     General: Skin is warm and dry. Neurological:      Mental Status: She is alert and oriented to person, place, and time. Psychiatric:         Behavior: Behavior normal.         Assessment & Plan:   1. Annual physical exam    2. Encounter for screening mammogram for malignant neoplasm of breast    3. Leukopenia, unspecified type    4.  Fatigue, unspecified type    Reviewed age-appropriate preventive health and safety recommendations with patient. Reviewed lab results. Encouraged regular exercise and healthy eating. Mammogram when due. Recheck 2 weeks from last check. Check labs.      Orders Placed This Encounter   Procedures    CBC [8999] [Q]    IRON AND TIBC [2621] [Q]    FOLIC ACID SERUM [193] [Q]    VITAMIN B12 [927][Q]    VITAMIN D, 25-HYDROXY [05230][Q]    FERRITIN [457] [Q]       Meds This Visit:  Requested Prescriptions      No prescriptions requested or ordered in this encounter       Imaging & Referrals:  [facility] SHIRA 2D+3D SCREENING BILAT (CPT=77067/26297)

## 2023-12-21 ENCOUNTER — PATIENT MESSAGE (OUTPATIENT)
Dept: INTERNAL MEDICINE CLINIC | Facility: CLINIC | Age: 68
End: 2023-12-21

## 2023-12-22 LAB
% SATURATION: 14 % (CALC) (ref 16–45)
ABSOLUTE BASOPHILS: 30 CELLS/UL (ref 0–200)
ABSOLUTE EOSINOPHILS: 60 CELLS/UL (ref 15–500)
ABSOLUTE LYMPHOCYTES: 1670 CELLS/UL (ref 850–3900)
ABSOLUTE MONOCYTES: 535 CELLS/UL (ref 200–950)
ABSOLUTE NEUTROPHILS: 2705 CELLS/UL (ref 1500–7800)
BASOPHILS: 0.6 %
EOSINOPHILS: 1.2 %
FERRITIN: 8 NG/ML (ref 16–288)
FOLATE, SERUM: >24 NG/ML
HEMATOCRIT: 38.1 % (ref 35–45)
HEMOGLOBIN: 12.3 G/DL (ref 11.7–15.5)
IRON BINDING CAPACITY: 480 MCG/DL (CALC) (ref 250–450)
IRON, TOTAL: 66 MCG/DL (ref 45–160)
LYMPHOCYTES: 33.4 %
MCH: 29.7 PG (ref 27–33)
MCHC: 32.3 G/DL (ref 32–36)
MCV: 92 FL (ref 80–100)
MONOCYTES: 10.7 %
MPV: 11.5 FL (ref 7.5–12.5)
NEUTROPHILS: 54.1 %
PLATELET COUNT: 218 THOUSAND/UL (ref 140–400)
RDW: 12.1 % (ref 11–15)
RED BLOOD CELL COUNT: 4.14 MILLION/UL (ref 3.8–5.1)
VITAMIN B12: 1066 PG/ML (ref 200–1100)
VITAMIN D, 25-OH, TOTAL: 28 NG/ML (ref 30–100)
WHITE BLOOD CELL COUNT: 5 THOUSAND/UL (ref 3.8–10.8)

## 2023-12-22 NOTE — TELEPHONE ENCOUNTER
I don't have any other code that would apply. You can let her know that it is a test that is often not covered. Won't be covered as screening typically either.

## 2023-12-22 NOTE — TELEPHONE ENCOUNTER
Fatigue, unspecified type is the dx code for the Vitamin D lab. AMS, do you want to change the diagnosis?

## 2023-12-22 NOTE — TELEPHONE ENCOUNTER
From: Fabiola Rodriguez  To: Gracie Díazericka  Sent: 12/21/2023 3:36 PM CST  Subject: Blood Tests ordered    I went in to [address] [place] [address] for the tests that you ordered during my recent physical appointment. The technician that I saw informed me that due to the Coding on the Vitamin D test, it may not be covered by my insurance. She suggested that if there was another code that could be used that would allow for better coverage that you can call them at: [phone] option #8.     Thank you,    Karina Gonzalez

## 2024-01-02 ENCOUNTER — TELEPHONE (OUTPATIENT)
Dept: INTERNAL MEDICINE CLINIC | Facility: CLINIC | Age: 69
End: 2024-01-02

## 2024-01-15 ENCOUNTER — TELEPHONE (OUTPATIENT)
Dept: INTERNAL MEDICINE CLINIC | Facility: CLINIC | Age: 69
End: 2024-01-15

## 2024-03-26 ENCOUNTER — HOSPITAL ENCOUNTER (OUTPATIENT)
Dept: MAMMOGRAPHY | Age: 69
Discharge: HOME | End: 2024-03-26
Attending: FAMILY MEDICINE
Payer: MEDICARE

## 2024-03-26 DIAGNOSIS — Z12.31 ENCOUNTER FOR SCREENING MAMMOGRAM FOR MALIGNANT NEOPLASM OF BREAST: ICD-10-CM

## 2024-03-26 PROCEDURE — 77067 SCR MAMMO BI INCL CAD: CPT | Performed by: FAMILY MEDICINE

## 2024-03-26 PROCEDURE — 77063 BREAST TOMOSYNTHESIS BI: CPT | Performed by: FAMILY MEDICINE

## 2024-04-16 NOTE — TELEPHONE ENCOUNTER
Requested Prescriptions     Pending Prescriptions Disp Refills    alendronate 70 MG Oral Tab 12 tablet 0     Sig: Take 1 tablet (70 mg total) by mouth once a week.       LOV:12/11/23 AMS    LAST CPE:12/11/23 AMS    Last Labs:12/5/23 LIPID,CBC,CMP    Last Refill:  Medication Quantity Refills Start End   alendronate 70 MG Oral Tab 12 tablet 0 11/29/2023 --   Sig:   Take 1 tablet (70 mg total) by mouth once a week.     Route:   Oral     Order #:   [id number]

## 2024-04-17 RX ORDER — ALENDRONATE SODIUM 70 MG/1
70 TABLET ORAL WEEKLY
Qty: 12 TABLET | Refills: 2 | Status: SHIPPED | OUTPATIENT
Start: 2024-04-17

## 2024-04-30 ENCOUNTER — PATIENT MESSAGE (OUTPATIENT)
Dept: INTERNAL MEDICINE CLINIC | Facility: CLINIC | Age: 69
End: 2024-04-30

## 2024-04-30 RX ORDER — ALENDRONATE SODIUM 70 MG/1
70 TABLET ORAL WEEKLY
Qty: 12 TABLET | Refills: 2 | OUTPATIENT
Start: 2024-04-30

## 2024-04-30 RX ORDER — ALENDRONATE SODIUM 70 MG/1
70 TABLET ORAL WEEKLY
Qty: 12 TABLET | Refills: 2 | Status: SHIPPED | OUTPATIENT
Start: 2024-04-30

## 2024-04-30 NOTE — TELEPHONE ENCOUNTER
[facility] - [address] [phone], [phone]     Patient has new pharmacy, needs to be below to go to go to new pharmacy.    alendronate 70 MG Oral Tab 12 tablet 2 4/17/2024 --    Sig - Route: Take 1 tablet (70 mg total) by mouth once a week. - Oral

## 2024-04-30 NOTE — TELEPHONE ENCOUNTER
From: Alicia Mckeon  To: Domonique Gandara  Sent: 4/30/2024 1:27 PM CDT  Subject: Medication refill    I had requested a refill for Alendronate on April 17. I changed to Medicare with drug coverage on 1/1/24. I inadvertently gave the wrong drug provider information. My prescription needs to be sent to [facility], not [facility].   I called and spoke with someone at your office who took the information and was sending it back but I just received a message that my refill request has been denied.   I did NOT receive and refills or communication from the first refill request from [facility].     Bree Mckeon

## 2024-07-09 ENCOUNTER — TELEPHONE (OUTPATIENT)
Dept: INTERNAL MEDICINE CLINIC | Facility: CLINIC | Age: 69
End: 2024-07-09

## 2024-07-09 DIAGNOSIS — Z13.220 SCREENING FOR LIPID DISORDERS: Primary | ICD-10-CM

## 2024-07-09 DIAGNOSIS — Z13.0 SCREENING FOR DISORDER OF BLOOD AND BLOOD-FORMING ORGANS: ICD-10-CM

## 2024-07-09 DIAGNOSIS — Z13.228 SCREENING FOR METABOLIC DISORDER: ICD-10-CM

## 2024-07-09 NOTE — TELEPHONE ENCOUNTER
Future Appointments   Date Time Provider Department Center   7/15/2024  3:15 PM Domonique Gandara DO [facility]     AMS - Lipid panel is not being covered by medicare. I am not sure what dx code to put for coverage. Pended annual labs

## 2024-07-10 LAB
ABSOLUTE BASOPHILS: 31 CELLS/UL (ref 0–200)
ABSOLUTE EOSINOPHILS: 140 CELLS/UL (ref 15–500)
ABSOLUTE LYMPHOCYTES: 1950 CELLS/UL (ref 850–3900)
ABSOLUTE MONOCYTES: 520 CELLS/UL (ref 200–950)
ABSOLUTE NEUTROPHILS: 2558 CELLS/UL (ref 1500–7800)
ALBUMIN/GLOBULIN RATIO: 1.5 (CALC) (ref 1–2.5)
ALBUMIN: 4 G/DL (ref 3.6–5.1)
ALKALINE PHOSPHATASE: 42 U/L (ref 37–153)
ALT: 22 U/L (ref 6–29)
AST: 29 U/L (ref 10–35)
BASOPHILS: 0.6 %
BILIRUBIN, TOTAL: 1 MG/DL (ref 0.2–1.2)
BUN/CREATININE RATIO: 42 (CALC) (ref 6–22)
BUN: 31 MG/DL (ref 7–25)
CALCIUM: 9.1 MG/DL (ref 8.6–10.4)
CARBON DIOXIDE: 27 MMOL/L (ref 20–32)
CHLORIDE: 104 MMOL/L (ref 98–110)
CHOL/HDLC RATIO: 2.4 (CALC)
CHOLESTEROL, TOTAL: 184 MG/DL
CREATININE: 0.73 MG/DL (ref 0.6–1)
EGFR: 88 ML/MIN/1.73M2
EOSINOPHILS: 2.7 %
GLOBULIN: 2.7 G/DL (CALC) (ref 1.9–3.7)
GLUCOSE: 81 MG/DL (ref 65–99)
HDL CHOLESTEROL: 77 MG/DL
HEMATOCRIT: 39.6 % (ref 35–45)
HEMOGLOBIN: 13 G/DL (ref 11.7–15.5)
LDL-CHOLESTEROL: 93 MG/DL (CALC)
LYMPHOCYTES: 37.5 %
MCH: 30.7 PG (ref 27–33)
MCHC: 32.8 G/DL (ref 32–36)
MCV: 93.6 FL (ref 80–100)
MONOCYTES: 10 %
MPV: 11 FL (ref 7.5–12.5)
NEUTROPHILS: 49.2 %
NON-HDL CHOLESTEROL: 107 MG/DL (CALC)
PLATELET COUNT: 262 THOUSAND/UL (ref 140–400)
POTASSIUM: 4.8 MMOL/L (ref 3.5–5.3)
PROTEIN, TOTAL: 6.7 G/DL (ref 6.1–8.1)
RDW: 12.7 % (ref 11–15)
RED BLOOD CELL COUNT: 4.23 MILLION/UL (ref 3.8–5.1)
SODIUM: 140 MMOL/L (ref 135–146)
TRIGLYCERIDES: 62 MG/DL
WHITE BLOOD CELL COUNT: 5.2 THOUSAND/UL (ref 3.8–10.8)

## 2024-07-15 ENCOUNTER — OFFICE VISIT (OUTPATIENT)
Dept: INTERNAL MEDICINE CLINIC | Facility: CLINIC | Age: 69
End: 2024-07-15
Payer: MEDICARE

## 2024-07-15 VITALS
OXYGEN SATURATION: 96 % | SYSTOLIC BLOOD PRESSURE: 114 MMHG | HEART RATE: 82 BPM | HEIGHT: 64 IN | DIASTOLIC BLOOD PRESSURE: 72 MMHG | WEIGHT: 140.81 LBS | BODY MASS INDEX: 24.04 KG/M2

## 2024-07-15 DIAGNOSIS — M85.80 OSTEOPENIA, UNSPECIFIED LOCATION: ICD-10-CM

## 2024-07-15 DIAGNOSIS — B07.0 PLANTAR WART: ICD-10-CM

## 2024-07-15 DIAGNOSIS — Z78.0 MENOPAUSE: ICD-10-CM

## 2024-07-15 DIAGNOSIS — Z00.00 ENCOUNTER FOR ANNUAL HEALTH EXAMINATION: Primary | ICD-10-CM

## 2024-07-15 NOTE — PROGRESS NOTES
Subjective:   Alicia Mckeon is a 68 year old female who presents for a Medicare Wellness Visit charge within the last 11 months and Patient may not meet criteria for AWV: Please evaluate for correct coding and scheduled follow up of multiple significant but stable problems.   1. Encounter for annual health examination (Primary)- doing well. Continues to be physically active.   2,3. Menopause, Osteopenia, unspecified location  -   taking Alendronate as prescribed with no issues. Walks for exercise.   4. Plantar wart- has been dealing with multiple and tries to file them away but hasn't worked well.     History/Other:   Fall Risk Assessment:   She has been screened for Falls and is low risk.      Cognitive Assessment:   Abnormal  What day of the week is this?: Correct  What month is it?: Correct  What year is it?: Correct  Recall \"Ball\": Correct  Recall \"Flag\": Correct  Recall \"Tree\": Incorrect    Functional Ability/Status:   Alicia Mckeon has a completely normal functional assessment. See flowsheet for details.      Depression Screening (PHQ):  PHQ-2 SCORE: 0  , done 7/10/2024             Advanced Directives:   She does NOT have a Living Will. [Do you have a living will?: No]  She does NOT have a Power of  for Health Care. [Do you have a healthcare power of ?: No]  Patient has Advance Care Planning documents but we do not have a copy in EMR. Discussed Advanced Care Planning with patient and instructed patient to get our office a copy to be scanned into EMR.      Patient Active Problem List   Diagnosis    Osteopenia    Vaginal dryness    H/O colonoscopy with polypectomy     Allergies:  She has No Known Allergies.    Current Medications:  Outpatient Medications Marked as Taking for the 7/15/24 encounter (Office Visit) with Domonique Gandara, DO   Medication Sig    alendronate 70 MG Oral Tab Take 1 tablet (70 mg total) by mouth once a week.    Multiple Vitamins-Minerals (MULTIVITAMIN ADULT OR) Take by  mouth.       Medical History:  She  has a past medical history of Allergic rhinitis, Anxiety (), Arthritis, Belching, Body piercing (), Depression (), Flatulence/gas pain/belching, Hemorrhoids, History of depression (2016), Osteoarthritis, Osteopenia, Tubular adenoma (2007), and Wears glasses ().  Surgical History:  She  has a past surgical history that includes colonoscopy (2007); other surgical history (10/01/2001); d & c (); and .   Family History:  Her She was adopted. Family history is unknown by patient.  Social History:  She  reports that she has never smoked. She has never used smokeless tobacco. She reports current alcohol use of about 1.0 standard drink of alcohol per week. She reports that she does not use drugs.    Tobacco:  She has never smoked tobacco.    CAGE Alcohol Screen:   CAGE screening score of 0 on 7/10/2024, showing low risk of alcohol abuse.      Patient Care Team:  Domonique Gandara DO as PCP - General  Kirstin Rivas PT as Physical Therapist (Physical Therapy)    Review of Systems  GENERAL: feels well otherwise  SKIN: denies any unusual skin lesions  EYES: denies blurred vision or double vision  HEENT: denies nasal congestion, sinus pain or ST  LUNGS: denies shortness of breath with exertion  CARDIOVASCULAR: denies chest pain on exertion  GI: denies abdominal pain, denies heartburn  : denies dysuria, vaginal discharge or itching, no complaint of urinary incontinence   MUSCULOSKELETAL: denies back pain  NEURO: denies headaches  PSYCHE: denies depression or anxiety  HEMATOLOGIC: denies hx of anemia  ENDOCRINE: denies thyroid history  ALL/ASTHMA: denies hx of allergy or asthma    Objective:   Physical Exam  General Appearance:  Alert, cooperative, no distress, appears stated age   Head:  Normocephalic, without obvious abnormality, atraumatic   Eyes:  PERRL, conjunctiva/corneas clear, EOM's intact both eyes   Ears:  Normal TM's and external ear  canals, both ears   Nose: Nares normal, septum midline,mucosa normal, no drainage or sinus tenderness   Throat: Lips, mucosa, and tongue normal; teeth and gums normal   Neck: Supple, symmetrical, trachea midline, no adenopathy;  thyroid: not enlarged, symmetric, no tenderness/mass/nodules; no carotid bruit or JVD   Back:   Symmetric, no curvature, ROM normal, no CVA tenderness   Lungs:   Clear to auscultation bilaterally, respirations unlabored   Heart:  Regular rate and rhythm, S1 and S2 normal, no murmur, rub, or gallop       Pelvic: Deferred   Extremities: Extremities normal, atraumatic, no cyanosis or edema   Pulses: 2+ and symmetric   Skin: Skin color, texture, turgor normal, no rashes multiple plantar warts ball of foot   Lymph nodes: Cervical, supraclavicular, and axillary nodes normal   Neurologic: Normal       /72   Pulse 82   Ht 5' 4\" (1.626 m)   Wt 140 lb 12.8 oz (63.9 kg)   SpO2 96%   BMI 24.17 kg/m²  Estimated body mass index is 24.17 kg/m² as calculated from the following:    Height as of this encounter: 5' 4\" (1.626 m).    Weight as of this encounter: 140 lb 12.8 oz (63.9 kg).    Medicare Hearing Assessment:   Hearing Screening    Screening Method: Whisper Test  Whisper Test Result: Pass         Visual Acuity:   Right Eye Visual Acuity: Corrected Right Eye Chart Acuity: 20/40   Left Eye Visual Acuity: Corrected Left Eye Chart Acuity: 20/30   Both Eyes Visual Acuity: Corrected Both Eyes Chart Acuity: 20/30   Able To Tolerate Visual Acuity: Yes        Assessment & Plan:   Alicia Mckeon is a 68 year old female who presents for a Medicare Assessment.     1. Encounter for annual health examination (Primary)  2. Menopause  -     XR DEXA BONE DENSITOMETRY (CPT=77080); Future; Expected date: 07/15/2024  3. Osteopenia, unspecified location  -     XR DEXA BONE DENSITOMETRY (CPT=77080); Future; Expected date: 07/15/2024  4. Plantar wart    The patient indicates understanding of these issues and  agrees to the plan.  Reinforced healthy diet, lifestyle, and exercise.  1. Encounter for annual health examination (Primary)- Reviewed age-appropriate preventive health and safety recommendations with patient. Reviewed lab results. Encouraged regular exercise and healthy eating.   2. Menopause  -     XR DEXA BONE DENSITOMETRY (CPT=77080); Future; Expected date: 07/15/2024  3. Osteopenia, unspecified location  -     XR DEXA BONE DENSITOMETRY (CPT=77080); Future; Expected date: 07/15/2024  4. Plantar wart- file further down and return for cryotherapy.     Return in about 1 year (around 7/15/2025).     Domonique Gandara DO, 7/15/2024     Supplementary Documentation:   General Health:  In the past six months, have you lost more than 10 pounds without trying?: 2 - No  Has your appetite been poor?: No  Type of Diet: Balanced  How does the patient maintain a good energy level?: Appropriate Exercise  How would you describe your daily physical activity?: Moderate  How would you describe your current health state?: Good  How do you maintain positive mental well-being?: Social Interaction;Visiting Friends;Visiting Family  On a scale of 0 to 10, with 0 being no pain and 10 being severe pain, what is your pain level?: 3 - (Mild)  In the past six months, have you experienced urine leakage?: 1-Yes  At any time do you feel concerned for the safety/well-being of yourself and/or your children, in your home or elsewhere?: No  Have you had any immunizations at another office such as Influenza, Hepatitis B, Tetanus, or Pneumococcal?: Yes    Health Maintenance   Topic Date Due    Annual Depression Screening  01/01/2024    COVID-19 Vaccine (7 - 2023-24 season) 01/25/2024    Influenza Vaccine (1) 10/01/2024    Annual Physical  12/11/2024    Mammogram  03/26/2025    Colorectal Cancer Screening  01/22/2028    DEXA Scan  Completed    Fall Risk Screening (Annual)  Completed    Pneumococcal Vaccine: 65+ Years  Completed    Zoster Vaccines   Completed

## 2025-01-06 ENCOUNTER — HOSPITAL ENCOUNTER (OUTPATIENT)
Dept: BONE DENSITY | Age: 70
Discharge: HOME | End: 2025-01-06
Attending: FAMILY MEDICINE
Payer: MEDICARE

## 2025-01-06 DIAGNOSIS — M85.80 OSTEOPENIA, UNSPECIFIED LOCATION: ICD-10-CM

## 2025-01-06 DIAGNOSIS — Z78.0 MENOPAUSE: ICD-10-CM

## 2025-01-06 PROCEDURE — 77080 DXA BONE DENSITY AXIAL: CPT | Performed by: FAMILY MEDICINE

## 2025-01-22 RX ORDER — ALENDRONATE SODIUM 70 MG/1
70 TABLET ORAL WEEKLY
Qty: 12 TABLET | Refills: 3 | Status: SHIPPED | OUTPATIENT
Start: 2025-01-22

## 2025-01-22 NOTE — TELEPHONE ENCOUNTER
Please Review. Protocol Failed; No Protocol     Requested Prescriptions   Pending Prescriptions Disp Refills    ALENDRONATE 70 MG Oral Tab [Pharmacy Med Name: ALENDRONATE SODIUM TABS 4'S 70MG] 12 tablet 3     Sig: TAKE 1 TABLET ONCE A WEEK       Osteoporosis Medication Protocol Failed - 1/22/2025 10:30 AM        Failed - In person appointment or virtual visit in the past 6 mos or appointment in next 3 mos     Recent Outpatient Visits              6 months ago Encounter for annual health examination    [facility], [address] [place] [address] Domonique Gandara,     Office Visit    1 year ago Annual physical exam    [facility], [address] [place] [address] Domonique Gandara, DO    Office Visit    2 years ago Annual physical exam    [facility], [address] [place] [address] Domonique Gandara, DO    Office Visit    2 years ago     [facility] Kirstin Rivas, PT    Office Visit    2 years ago     [facility] Kirstin Rivas, PT    Office Visit          Future Appointments         Provider Department Appt Notes    In 1 week Darron Bray MD [facility] NP / Osteo / Recent Dexa in chart  **[facility]**  **01/16/2025 LVM to paolahedshannon COLE will be out of office at the time of the appt. Can offer Dr. Almaraz or next availability with Dr. Bray -adria    *Spoke w/ patient; rescehduled from 1/22/25-sw 1/16/25                    Passed - DEXA scan within past 2 years        Passed - CMP within the past 12 months        Passed - Calcium level between 8.3 and 10.3     Lab Results   Component Value Date    CA 9.1 07/09/2024               Passed - GFR level greater than 35     GFR Evaluation  EGFRCR: 88 , resulted on 7/9/2024          Passed - Medication is active on med list               Future Appointments         Provider Department Appt Notes    In 1 week Darron Bray MD [facility],  [facility] NP / Osteo / Recent Dexa in chart  **[facility]**  **01/16/2025 LVM to reschedule MD will be out of office at the time of the appt. Can offer Dr. Almaraz or next availability with Dr. Asa molina    *Spoke w/ patient; rescehduled from 1/22/25- 1/16/25          Recent Outpatient Visits              6 months ago Encounter for annual health examination    [facility], [address] [place], [place] [address]Francesco Anne, DO    Office Visit    1 year ago Annual physical exam    [facility], [address] [place], [place] [address]Francesco Anne,     Office Visit    2 years ago Annual physical exam    [facility], [address] [place] [address], Domonique Lopez DO    Office Visit    2 years ago     [facility] Kirstin Rivas, PT    Office Visit    2 years ago     [facility] Kirstin Rivas, PT    Office Visit

## 2025-01-30 ENCOUNTER — OFFICE VISIT (OUTPATIENT)
Facility: CLINIC | Age: 70
End: 2025-01-30
Payer: MEDICARE

## 2025-01-30 VITALS
BODY MASS INDEX: 24 KG/M2 | WEIGHT: 141.38 LBS | SYSTOLIC BLOOD PRESSURE: 118 MMHG | RESPIRATION RATE: 16 BRPM | DIASTOLIC BLOOD PRESSURE: 70 MMHG

## 2025-01-30 DIAGNOSIS — M81.6 LOCALIZED OSTEOPOROSIS WITHOUT CURRENT PATHOLOGICAL FRACTURE: Primary | ICD-10-CM

## 2025-01-30 DIAGNOSIS — Z13.21 ENCOUNTER FOR VITAMIN DEFICIENCY SCREENING: ICD-10-CM

## 2025-01-30 DIAGNOSIS — E83.19 OTHER DISORDERS OF IRON METABOLISM: ICD-10-CM

## 2025-01-30 PROCEDURE — G2212 PROLONG OUTPT/OFFICE VIS: HCPCS | Performed by: STUDENT IN AN ORGANIZED HEALTH CARE EDUCATION/TRAINING PROGRAM

## 2025-01-30 PROCEDURE — 99215 OFFICE O/P EST HI 40 MIN: CPT | Performed by: STUDENT IN AN ORGANIZED HEALTH CARE EDUCATION/TRAINING PROGRAM

## 2025-01-30 NOTE — PROGRESS NOTES
ENDOCRINOLOGY, DIABETES & METABOLISM CONSULT NOTE   Date: 01/30/25  Name: Alicia Mckeon  Referring Physician: No ref. provider found    Subjective:    HISTORY OF PRESENT ILLNESS:   Alicia Mckeon is a 69 year old female seen in consultation for her bone health evaluation    Osteoporosis history:  -Diagnosed with osteopenia (as per notes ) from DEXA.  Has been on Fosamax at least since 2013.  Reports initially was started on 35 mg/week then increase to 70 mg/week  -Does report some history of being on drug holidays.  But is not sure.  -(As per notes, presumably stopped in 2016.  As per notes restarted in 2020)  -Patient reports that she had DEXA at an earlier age than required mainly after her neurosurgeon did procedure for cervical spine.  Has a plate in her neck.    -No history of fragility fractures      OSTEOPOROSIS RISK FACTORS ASSESSMENT  Postmenopausal Yes, early 50s, not on hormone therapy   Maternal hx of osteoporosis or hx of parental hip fx:  No Adopted child   Recent Fracture: No   Previous fracture after the age of 50:  No   Hx of kidney stones: No   Frequent falls: Yes, Stepped on stick and fell, rolled over.    Poor vision: No   Decrease in height: Yes, 2 inches      New or Worsening Back Pain: No   History of Vit D insufficiency:   Current Vitamin D supplementation: Yes, D  Caltrate + D3 - 3 pills/day   Poor intake of calcium:  Daily calcium intake: Yes, on supplementation as above   Caffeine intake: No   Smoking: No   Alcohol intake >3/d:  No   Hx of thyroid disease: No   Hx of calcium problems or hyperparathyroidism: No   Previous treatment for osteoporosis: Yes, see above    Malabsorption, chronic diarrhea, celiac sprue: No   History of RA: No   History of disordered eating: No     Intake of medications that cause bone loss:  Long term steroid use: No  Aromatase inhibitor: No  Seizure medications: No  GnRH agonist: No  PPI: No  Lithium: No  SSRI: No  Actos/Avandia: No    Treatment  Contraindications:  Dysphagia: No  Pain on swallowing: No  Plans for dental procedures: No   History of radiation: No  History of bone tumors/bone cancer: No  History of Paget disease of the bone: No  Personal history of malignancy: No  Kidney function:   Lab Results   Component Value Date    ALKPHO 42 2024    EGFRCR 88 2024     Alk phos: 42  Atrial fibrillation/CAD: None  Hyperparathyroidism: None    Activities of daily living:  Exercise: Started strength training recently, 3-5 miles a day walking (goal 12,000 goals/yr)Dog walking.  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     Allergies, PMH, SocHx and FHx reviewed and updated as appropriate in Epic on    Magnesium 80 MG Oral Tab Take 4.75 tablets (380 mg total) by mouth BID (Nitrates).      Calcium Carbonate Antacid 400 MG Oral Chew Tab Chew 0.45 tablets (180 mg total) by mouth BID (Nitrates).      alendronate 70 MG Oral Tab Take 1 tablet (70 mg total) by mouth once a week. 12 tablet 3    Multiple Vitamins-Minerals (MULTIVITAMIN ADULT OR) Take by mouth.       Allergies[1]  Current Outpatient Medications   Medication Sig Dispense Refill    Magnesium 80 MG Oral Tab Take 4.75 tablets (380 mg total) by mouth BID (Nitrates).      Calcium Carbonate Antacid 400 MG Oral Chew Tab Chew 0.45 tablets (180 mg total) by mouth BID (Nitrates).      alendronate 70 MG Oral Tab Take 1 tablet (70 mg total) by mouth once a week. 12 tablet 3    Multiple Vitamins-Minerals (MULTIVITAMIN ADULT OR) Take by mouth.       Past Medical History:    Allergic rhinitis    Anxiety    Arthritis    Belching    Body piercing    Ears Pierced    Depression    Flatulence/gas pain/belching    Hemorrhoids    History of depression    Sudden Death of     Osteoarthritis    Osteopenia    Tubular adenoma    Wears glasses     Past Surgical History:   Procedure Laterality Date    Colonoscopy  2007    W/polypectomy  DENG Haas MD    D & c  1979          Other surgical history  10/01/2001    Anterior  disk fusion 2 levels     Social History     Socioeconomic History    Marital status:    Tobacco Use    Smoking status: Never    Smokeless tobacco: Never   Vaping Use    Vaping status: Never Used   Substance and Sexual Activity    Alcohol use: Yes     Alcohol/week: 1.0 standard drink of alcohol     Comment: 1-2 / month    Drug use: No   Other Topics Concern    Caffeine Concern No    Exercise Yes    Seat Belt Yes    Special Diet No    Stress Concern No    Weight Concern No     Family History   Adopted: Yes   Family history unknown: Yes       REVIEW OF SYSTEMS: 10 point ROS completed, refer to HPI for pertinent positives    Objective:   PHYSICAL EXAMINATION:    Vitals:    01/30/25 1334   BP: 118/70   Resp: 16   Weight: 141 lb 6.4 oz (64.1 kg)     BMI: Body mass index is 24.27 kg/m².     General Appearance:  Alert, in no acute distress, well developed  Eyes:  normal conjunctivae, sclera  Ears/Nose/Mouth/Throat/Neck:  normal hearing, normal speech and no palpable thyroid nodules  Respiratory:  breathing comfortably on room air, is clear to auscultation bilaterally  Cardiovascular:  regular rate and rhythm, no murmurs, S3 or S4, no peripheral edema  Psychiatric:  Oriented to person, place and time, appropriate mood & affect  Skin: Normal moisture and skin texture  Neuro: sensory grossly intact, motor grossly intact. normal gait.    Lab Review       No results found for: \"EAG\", \"A1C\"   Lab Results   Component Value Date    GLU 81 07/09/2024    BUN 31 (H) 07/09/2024    BUNCREA 42 (H) 07/09/2024    CREATSERUM 0.73 07/09/2024    GFRNAA 91 11/09/2021    GFRAA 106 11/09/2021    CA 9.1 07/09/2024    ALKPHO 42 07/09/2024    AST 29 07/09/2024    ALT 22 07/09/2024    BILT 1.0 07/09/2024    TP 6.7 07/09/2024    ALB 4.0 07/09/2024    GLOBULIN 2.7 07/09/2024    AGRATIO 1.5 07/09/2024     07/09/2024    K 4.8 07/09/2024     07/09/2024    CO2 27 07/09/2024        Lab Results   Component Value Date    VITD 28 (L)  12/21/2023        Radiology Review            XR DEXA BONE DENSITOMETRY (CPT=77080)    Result Date: 1/6/2025  PROCEDURE:  XR DEXA BONE DENSITOMETRY (CPT=77080)  COMPARISON:  [facility], XR DEXA BONE DENSITOMETRY (CPT=77080), 1/03/2023, 2:39 PM.  INDICATIONS:  M85.80 Osteopenia, unspecified location Z78.0 Menopause  PATIENT STATED HISTORY: (As transcribed by Technologist)  Menopause.       LUMBAR SPINE ANALYSIS RESULTS:    Bone mineral density (BMD) (g/cm2):  0.995  Lumbar T-Score:  -0.5    % young normals:  95    % age matched controls:  123    Change from prior spine examination:  3.1*%           TOTAL HIP ANALYSIS RESULTS:      Bone mineral density (BMD) (g/cm2):  0.621    Total Hip T-Score:  -2.6    % young normals:  66    % age matched controls:  83    Change from prior hip examination:  -5.6*%           FEMORAL NECK ANALYSIS RESULTS:      Bone mineral density (BMD) (g/cm2):  0.613    Femoral neck T-Score:  -2.1    % young normals:  72    % age matched controls:  95    Change from prior hip examination:  -4.3%      ADDITIONAL FINDINGS:  No significant additional findings.             CONCLUSION:  Bone mineral density values for total hip are compatible with the diagnosis of osteoporosis by WHO definition (T score less than -2.5). If therapy is initiated, a follow-up study in 1 year may aid in evaluation of therapeutic efficacy.  Recommendation:  The Bone Health and Osteoporosis Foundation (BHOF) recommends pharmacological treatment for patients with a FRAX 10-year risk score of 3% or higher for a hip fracture or 20% or higher for a major osteoporotic fracture, to prevent osteoporosis and reduce fracture risk. All treatment decisions require clinical judgment and consideration of individual patient factors, including patient preferences, comorbidities, previous drug use, risk fractures not captured in the FRAX model (e.g. frailty, falls, vitamin D deficiency, increased bone turnover, interval significant decline  in bone density) and possible under- or over-estimation of fracture risk by FRAX. Additional information regarding the FRAX model can be found at the OF website: bonehealthandosteoporosis.org.  The World Health Organization has defined the following categories based on bone density: Normal bone:  T-score greater than or equal to -1 Osteopenia: T-score  less than -1 and greater  than -2.5 Osteoporosis:  T-score less than or equal -2.5   LOCATION:  [facility]     Dictated by (CST): Emili Torres MD on 1/06/2025 at 2:39 PM     Finalized by (CST): Emili Torres MD on 1/06/2025 at 2:40 PM       XR DEXA BONE DENSITOMETRY (CPT=77080)    Result Date: 1/3/2023  PROCEDURE:  XR DEXA BONE DENSITOMETRY (CPT=77080)  LOCATION:  [id number]  COMPARISON:  [facility], XR DEXA BONE DENSITOMETRY (CPT=77080), 12/07/2020, 2:19 PM.  INDICATIONS:    Z78.0 Menopause M85.80 Osteopenia, unspecified location  PATIENT STATED HISTORY: (As transcribed by Technologist)  Osteopenia,unspecified location.       LUMBAR SPINE ANALYSIS RESULTS:    Bone mineral density (BMD) (g/cm2):  0.965  Lumbar T-Score:  -0.7    % young normals:  92    % age matched controls:  117    Change from prior spine examination:  4.7*%           TOTAL HIP ANALYSIS RESULTS:      Bone mineral density (BMD) (g/cm2):  0.658    Total Hip T-Score:  -2.3    % young normals:  70    % age matched controls:  86    Change from prior hip examination:  -1.7%           FEMORAL NECK ANALYSIS RESULTS:      Bone mineral density (BMD) (g/cm2):  0.641    Femoral neck T-Score:  -1.9    % young normals:  75    % age matched controls:  98    Change from prior hip examination:  -2.2%                 CONCLUSION:  Bone mineral density values for left hip are compatible with the diagnosis of osteopenia by WHO definition (T score between -1.0 and -2.5).  If therapy is initiated, follow-up study is recommended in 2 years for further evaluation of therapeutic efficacy.     The World Health Organization has defined  the following categories based on bone density: Normal bone:  T-score better than -1 Osteopenia: T-score between -1 and -2.5 Osteoporosis:  T-score less than -2.5    Dictated by (CST): Ashlie Jin MD on 1/03/2023 at 3:38 PM     Finalized by (CST): Ashlie Jin MD on 1/03/2023 at 3:38 PM       XR DEXA BONE DENSITOMETRY (CPT=77080)    Result Date: 12/7/2020  PROCEDURE:  XR DEXA BONE DENSITOMETRY (CPT=77080)  COMPARISON:  [facility], XR DEXA BONE DENSITOMETRY (CPT=77080), 12/13/2018, 12:59 PM.  INDICATIONS:    M85.80 Osteopenia, unspecified location  PATIENT STATED HISTORY: (As transcribed by Technologist)  Osteopenia,unspecified location.  History of Fosamax therapy.       LUMBAR SPINE ANALYSIS RESULTS:    Bone mineral density (BMD) (g/cm2):  0.922  Lumbar T-Score:  -1.1    % young normals:  88    % age matched controls:  110    Change from prior spine examination:  2.2%           TOTAL HIP ANALYSIS RESULTS:      Bone mineral density (BMD) (g/cm2):  0.669    Total Hip T-Score:  -2.2    % young normals:  71    % age matched controls:  86    Change from prior hip examination:  -4.9*%           FEMORAL NECK ANALYSIS RESULTS:      Bone mineral density (BMD) (g/cm2):  0.655    Femoral neck T-Score:  -1.7    % young normals:  77    % age matched controls:  98    Change from prior hip examination:  -1.5%      ADDITIONAL FINDINGS:  Lumbar scoliosis            CONCLUSION:  Bone mineral density values for the lumbar spine, total left hip and left femoral neck are compatible with the diagnosis of osteopenia by WHO definition (T score between -1.0 and -2.5).  There has been significant interval decrease in bone mineral density of the total left hip compared to 12/13/2018.  A follow-up study is recommended in 2 years for further evaluation of therapeutic efficacy.   The estimated ten-year fracture risk is 9.3% for major osteoporotic fracture and 1.3% for hip fracture.  Fracture probability may be lower given that the patient  has received treatment.   The World Health Organization has defined the following categories based on bone density: Normal bone:  T-score better than -1 Osteopenia: T-score between -1 and -2.5 Osteoporosis:  T-score less than -2.5    Dictated by (CST): Louie Wilson MD on 12/07/2020 at 2:58 PM     Finalized by (CST): Louie Wilson MD on 12/07/2020 at 2:59 PM       XR DEXA BONE DENSITOMETRY (CPT=77080)    Result Date: 12/13/2018  PROCEDURE:  XR DEXA BONE DENSITOMETRY (CPT=77080)  COMPARISON:  [facility], XR DEXA BONE DENSITOMETRY (CPT=77080), 3/07/2016, 10:54.  [facility], XR DEXA BONE DENSITOMETRY (CPT=77080), 1/27/2014, 7:45.  INDICATIONS:    M85.80 Other specified disorders of bone density and structure, unspecified site  PATIENT STATED HISTORY: (As transcribed by Technologist)  Osteopenia,unspecified location.       LUMBAR SPINE ANALYSIS RESULTS:    Bone mineral density (BMD) (g/cm2):  0.902  Lumbar T-Score:  -1.3    % young normals:  86    % age matched controls:  106    Change from prior spine examination:  -3.1*% A positive change demonstrates increase in BMD since prior examination, a negative change demonstrates decrease in BMD.  This comparison is statistically significant at the 95% confidence level.  This places patient at World Health Organization (WHO) classification of osteopenia.            TOTAL HIP ANALYSIS RESULTS:      Bone mineral density (BMD) (g/cm2):  0.704    Total Hip T-Score:  -2.0    % young normals:  75    % age matched controls:  89    Change from prior hip examination:  -5.4*% A positive change demonstrates increase in BMD since prior examination, a negative change demonstrates decrease in BMD.  This comparison is statistically significant at the 95% confidence level.  This places patient at World Health Organization (WHO) classification of osteopenia.            FEMORAL NECK ANALYSIS RESULTS:      Bone mineral density (BMD) (g/cm2):  0.665    Femoral neck T-Score:  -1.7    % young normals:  78     % age matched controls:  98    Change from prior hip examination:  -9.1*%    A positive change demonstrates increase in BMD since prior examination, a negative change demonstrates decrease in BMD.  This comparison is statistically significant at the 95% confidence level.  This places patient at World Health Organization (WHO) classification of osteopenia  FRAX - WHO Fracture Risk Assessment Tool This places patient at 10 year fracture risk at: Major Osteoporotic Fracture: 9.3 % Hip Fracture: 1.1 %.   ADDITIONAL FINDINGS:  No significant additional findings.       CONCLUSION:  WHO Classification as described above.   The World Health Organization has defined the following categories based on bone density: Normal bone:  T-score better than -1 Osteopenia: T-score between -1 and -2.5 Osteoporosis:  T-score less than -2.5     Dictated by: Karis Jacobsen MD on 12/13/2018 at 13:21     Approved by: Karis Jacobsen MD            DEXA - BONE DENSITOMETRY    Result Date: 3/7/2016  PROCEDURE:  DEXA SCAN (BONE DENSITOMETRY)  COMPARISON:  [facility] DEXA BONE DENSITOMETRY (CPT=77080), 1/27/2014, 7:45.  [facility] BONE DENSITOMETRY - DEXA, 1/04/2012, 14:45.  INDICATIONS:    M81.0 Age-related osteoporosis without current pathological fracture  PATIENT STATED HISTORY:  Dexa Bone Density Osteoporosis Patient currently taking Fosamax      The bone mineral density examination was performed on a Hologic machine  LUMBAR SPINE: The lumbar spine bone mineral density is 0.931 g/cm^2 with a T-score of -1.1 and a Z-score of 0.5 .  This demonstrates a 2.3 % change from the prior examination (A positive change demonstrates increase in BMD since prior examination, a negative change demonstrates decrease in BMD). This comparison is not statistically significant at the 95% confidence level.  This places patient at World Health Organization (WHO) classification of osteopenia for the lumbar spine.  LEFT FEMORAL NECK: The left femoral neck bone  mineral density is 0.732 g/cm^2 with a T-score of -1.1 and a Z-score of 0.3 .  This demonstrates a -6.7 % change from the prior examination  (A positive change demonstrates increase in BMD since prior examination, a negative change demonstrates decrease in BMD). This comparison is not statistically significant at the 95% confidence level. .  This places patient at World Health Organization (WHO) classification of osteopenia for the left femoral neck.  LEFT FEMUR TOTAL: The left femur total bone mineral density is 0.744 g/cm^2 with a T-score of -1.6 and a Z-score of -0.6 .  This demonstrates a -4.1 % change from the prior examination  (A positive change demonstrates increase in BMD since prior examination, a negative change demonstrates decrease in BMD).  This comparison is not statistically significant at the 95% confidence level..  This places patient at World Health Organization (WHO) classification of osteopenia for the left femur total.  This places patient at 10 year fracture risk at: Major Osteoporotic Fracture: 7.4 % Hip Fracture: 0.5 %  ADDITIONAL FINDINGS: No significant additional findings      CONCLUSION: WHO Classification as described above.    The World Health Organization has defined the following categories based on bone density: Normal bone:  T-score better than -1 Osteopenia: T-score between -1 and -2.5 Osteoporosis:  T-score less than -2.5    Dictated by: Karis Jacobsen MD on 3/07/2016 at 11:05     Approved by: Karis Jacobsen MD              DEXA Year Location Therapy Lumbar BMD Lumbar T-Score Total Hip BMD Total Hip T-Score Femoral Neck BMD Femoral Neck T-Score Forearm BMD Forearm T-score   2012 [facility]  0.9 -1.1 0.806 -1.1 0.809 -0.4     2014 [facility] Fosamax 0.91 -1.2 0.776 -1.4 0.784 -0.6     2016 [facility] Fosamax 0.931 -1.1 0.7 4 4 -1.6 0.732 -1.1     2018 [facility]  0.902 -1.3 0.704 -2 0.665 -1.7     2020 [facility] restarted Fosamax 0.922 -1.1 0.669   -2.2 0.655 -1.7     2023 [facility] 0.965  -0.7 0.658 -2.3 0.641 -1.9     2025 [facility] Fosamax 0.995 -0.5 0.6-1 -2.6 0.613 -2.1                                    ASSESSMENT/PLAN:  Alicia Mckeon is a 69 year old female seen in consultation for  #Osteoporosis  #Bisphosphonate therapy  Discussed pathophysiology of bone loss and clinical significance of DEXA scans with the patient.  The patient has confirmed osteoporosis with low T-scores in total hip.  No previous history of fracture  Explained the patient's risk factors for osteoporosis; postmenopausal, , elderly.  Unknown family history since patient was adopted  The patient is at high risk for future osteoporotic fractures if her osteoporosis remains untreated.  Recommended workup for secondary causes of osteoporosis, including SPEP, PTH, Alk Phos levels, bone turnover markers, and vitamin D levels.  Discussed the importance of adopting lifestyle measures, such as adequate calcium and vitamin D intake, exercise, smoking cessation, counseling on fall prevention, and avoidance of heavy alcohol use, to reduce bone loss.  Suggested 1200 mg of elemental calcium daily (total diet plus supplement) and 1000 IU of vitamin D daily as general recommendations.  Recommended pharmacologic therapy for postmenopausal women with established osteoporosis or fragility fracture.  Discussed available treatment options for osteoporosis, including bisphosphonates (oral vs. IV) Prolia injections, other anabolics (Forteo, Tymlos), Evenity as well as their indications, risks, and benefits, including black box warnings.  Discussed that since patient does not have severe osteoporosis and has been on bisphosphonate therapy for approximately 8 to 9 years excluding some years of drug holiday in between([id number]), would only consider treating for the next 2 to 3 years to prevent complications from long-term use of bisphosphonates.  Discussed that her BMD continues to worsen in spite of being on oral bisphosphonate we will need  to switch to 100 drug therapy.  Possible options include: Prolia followed by Reclast infusions versus Reclast infusions only.  Patient will decide and let us know.  Meanwhile continue alendronate 70 mg daily.  Re IV bisphosphonate therapy: the patient's GFR exceeds 35 ml/min. We reviewed the potential for, rare side effects of osteonecrosis of the jaw and atypical femoral fractures, which are more likely to occur after long-term bisphosphonate use, and in the case of ONJ, following higher-dose IV treatment in patients with malignancy.  We also discussed the possibility of using denosumab. We reviewed the potential adverse effects, which include hypocalcemia (more likely to occur with Vitamin D deficiency or renal insufficiency), rare but serious skin infections (cellulitis), and very rarely with osteonecrosis of the jaw (more likely in higher doses used for malignancy), or atypical femoral fractures. I discussed that these risks are likely to be far outweighed by the potential fracture risk reduction.There is a concern for increased incidence of vertebral fracture(s) associated with denosumab if therapy is suddenly stopped and pt does not receive a following therapy afterwards. Therefore, choosing denosumab will be a life-long choice or will need a subsequent therapy at the end of denosumab cycle to protect bone further. This was discussed in detail with pt.  Advised patient to see the dentist regularly, to notify dentist of using bisphosphonate or denosumab therapy, and to avoid non-emergent dental procedures such as implants and extractions. The patient was also advised to notify us if they develop new or unusual persistent thigh or groin pain.   Dental clearance form was provided    The above plan was discussed in detail with the patient who verbalized understanding and agreement.      A total of 75 minutes was spent today on obtaining history, reviewing outside records, reviewing pertinent labs/imaging,  reviewing relevant pathophysiology with patient, evaluating patient, providing multiple treatment options, communicating with patient's other providers as appropriate, and completing documentation and orders.      Darron Bray MD  [facility]  1/30/2025     Note to patient: The 21 Century Cures Act makes medical notes like these available to patients in the interest of transparency. However, be advised this is a medical document. It is intended as peer to peer communication. It is written in medical language and may contain abbreviations or verbiage that are unfamiliar. It may appear blunt or direct. Medical documents are intended to carry relevant information, facts as evident, and the clinical opinion of the practitioner.      In reviewing this note, please be advised that Dragon Voice Recognition software used to dictate the note may have made errors in recognizing some of the words or phrases.            [1] No Known Allergies

## 2025-01-30 NOTE — PATIENT INSTRUCTIONS
Summary of our visit:  Lifestyle changes to improve bone health  Resistance, balance, and weight-bearing  exercises. Choose safe movements that don’t increase the risk of falling  Eat a balanced diet that include: calcium rich foods, dairy products fortified with vitamin D, and fish if possible. Get enough calcium and vitamin D, either through diet or supplements (at   least 1,000-1,200 mg of calcium; 400-800 IU of vitamin D daily under age 50 or at least 800-1,000 IU after age 50  Some resources:  NUTRITION: https://www.bonehealthandosteoporosis.org/patients/treatment/nutrition/  GENERAL INFO: https://www.bonehealthandosteoporosis.org/patients/  Please have your dentist fax the clearance letter to us  Get labs early morning fasting  Here is some information about the medicines we spoke about: let us know what you feel next visit  Meanwhile - continue alendronate 70 gm weekly      General follow up information:  Please let us know if you require any refills at least 1 week prior to your medication running out. If you do run out of medication, please call our office ASAP to request refills (do not wait until your follow up).  Please call us if you experience any problems with insurance coverage of medication, lab work, or imaging.   Lab results and imaging will typically be reviewed at follow up appointments, or within 3-5 business days of ALL results being in if you do not have an appointment scheduled in the near future. Our office will contact you for any abnormal results requiring more urgent follow up or action.   The on-call pager is for urgent matters only. If you are a type 1 diabetic and run out of insulin after business hours 8AM-4PM, you may call the on-call pager for a refill to a 24 hour pharmacy. If you have adrenal insufficiency and run out of steroids, you may call the on-call pager for a refill to a 24 hour pharmacy. All other refill requests should be requested during business hours.    Return Visit    [x] Dr. Bary in last week of Feb  [x] Fasting/8AM labs  [x] Dental clearance form          OSTEOPOROSIS:  Osteoporosis, which means porous bones, is a progressive condition in which bones become structurally weak and are more likely to fracture or break. Even with a healthy lifestyle, you may need additional therapy to protect against bone loss and fractures. Your doctor may need to prescribe medications such as:     Bisphosphonates (alendronate - fosamax, risedronate, ibandronate, zoledronic acid - reclast)   Raloxifene   Teriparatide   Abaloparatide   Romosozumab   Estrogen (when also prescribed for the relief of menopausal symptoms)   Calcitonin   These treatment options are effective but may have side effects. Talk with your doctor to determine whether you need treatment and which option is best for you. The [place] Food and Drug Administration (FDA) has approved several medications for preventing and treating osteoporosis.       Bisphosphonates are the kind of medicine used most often to treat osteoporosis. They do this by:     Preventing bones from getting weaker by slowing the natural breakdown of bone.  Lowering the risk of spine fractures and most lower the risk of hip fracture and other kinds of fractures as well.  Preventing and treating postmenopausal osteoporosis by slowing bone loss while increasing bone density mass.      Several types of bisphosphonates are available, in pill or liquid form. Some are given intravenously (IV), which means the medicine is injected into a vein at the doctor’s office or a hospital. Bisphosphonate medications include:    Alendronate (Fosamax®). Tablet available in daily and weekly forms   Risedronate (Actonel, Atelvia®). Tablet available in daily, weekly, and monthly forms   Ibandronate (Boniva®). Available in monthly tablet or as an injection once every three months   Zoledronic Acid (Reclast®). An intravenous infusion given once a year for treatment, or every two years  for prevention   The bisphosphonates alendronate, risedronate, and zoledronic acid have also been approved for the treatment of steroid-induced osteoporosis in men and women who need long-term use of medications to treat inflammatory conditions (which can contribute to osteoporosis).     Side effects of bisphosphonates are uncommon, but may include abdominal, bone, or muscle pain. These medications may also cause nausea or heartburn. Tablet forms may cause irritation of the esophagus.     Experts say that the benefits of taking bisphosphonates greatly outweigh the risks for most people with osteoporosis. Overall, when you take this type of medicine, your chance of preventing fractures is high, and the risk of serious problems is low.      However, side effects of bisphosphonates can include:     Nausea, heartburn, swallowing problems, or irritation of the esophagus (the tube that carries food and liquid from your mouth to your stomach)   Pain in the muscles, joints, bones, or stomach   Some people have reported serious side effects, but studies have shown that these are very rare. Bisphosphonates has been linked to osteonecrosis (degeneration) of the jaw bone, particular after high-dose, long-term therapy, as might be given during cancer treatment. The risk of osteonecrosis of the jaw is greatest after dental operations. There is also a concern that long-term treatment may increase the risk of so-called atypical femoral fractures--fractures through the shaft of the thigh bone with little or no trauma.     Bisphosphonates are not recommended for premenopausal women who may become pregnant or for people with severely impaired kidney function.     If you take a bisphosphonate and you’re having side effects, tell your doctor. Your doctor might give you a different kind of medicine to overcome the side effects. For example, taking medicine through an IV instead of swallowing a pill can overcome heartburn.        FOSAMAX    Contraindications:   Abnormalities of the esophagus which delay emptying such as stricture or achalasia  Inability to stand/sit upright for at least 30 minutes  Patients at increased risk of aspiration  Hypocalcemia   Hypersensitivity to any component of this product     Warnings:   Severe irritation of upper gastrointestinal (GI) mucosa can occur.   Hypocalcemia can worsen and must be corrected prior to use.  Severe bone, joint, muscle pain may occur. Discontinue use if severe symptoms develop.   Osteonecrosis of the jaw has been reported.   Atypical femur fractures have been reported. Contact the clinic if you develop new thigh or groin pain to rule out an incomplete femoral fracture.     Adverse reactions:   Most common adverse reactions (>=3%) are abdominal pain, acid regurgitation, constipation, diarrhea, dyspepsia, musculoskeletal pain,   nausea.     Administration:   Must be taken with 6-8 oz plain water at least 30 minutes before the first food, drink, or medication of the day; do not lie down for at least 30 minutes after taking FOSAMAX and until after food.      RECLAST    Contraindications  Hypocalcemia   Hypersensitivity to any component of Reclast    Warnings:   Renal toxicity may be greater in patients with underlying renal impairment or   with other risk factors such as dehydration that may occur in the post-dosing   period. Patients with severe renal impairment (creatinine clearance <35 mL/min)   should not receive Reclast.   Osteonecrosis of the jaw has been reported rarely in postmenopausal   osteoporosis patients treated with bisphosphonates, including zoledronic acid.   All patients should have a routine oral exam by the prescriber prior to treatment    Reclast can cause fetal harm. Women of childbearing potential should be   advised of the potential hazard to the fetus and to avoid becoming pregnant   Severe incapacitating bone, joint, and/or muscle pain may occur    Adverse  reactions:  The most common adverse reactions (>10%) were pyrexia, myalgia, headache, arthralgia, pain in extremity. Other clinically important adverse reactions were flu-like illness, nausea, vomiting and diarrhea      Denosumab (also called Prolia) is also approved as first-line therapy to treat bone loss, but it is commonly used when patients cannot tolerate other osteoporosis medicines or if other medicines are not working well. The medication is also used for preventing and treating osteoporosis in postmenopausal women who are at increased risk for fractures.     Denosumab can be used to treat bone loss in women who are receiving treatment for breast cancer. It is also used to prevent bone problems in patients with bone metastases (cancer that has spread to the bones) from certain types of tumors.     Denosumab is injected under the skin, usually by a doctor or nurse. When denosumab is used to treat osteoporosis, it is usually injected once every 6 months. When is used to reduce fractures from cancer that has spread to the bones, it is usually injected once every 4 weeks. Denosumab can also be used in people with reduced kidney function as the medication is not cleared from the body by the kidneys.     Denosumab may cause sides effects. Call your doctor right away if you have a serious side effect such as:     Numbness or tingling around your mouth or in your fingers or toes   Fast or slow heart rate   Muscle cramps or contraction   Overactive reflexes   Trouble breathing   Less serious side effects of denosumab may include:     Feeling weak or tired   Diarrhea, nausea   Headache   Denosumab has also been linked to osteonecrosis of the jaw and atypical femoral fractures--and to multiple spinal fractures if treatment is discontinued. It’s important for both safety and the effectiveness of therapy to stick as closely as possible to the injection schedule. This is not a complete list of side effects and others may  occur. Ask your doctor for medical advice about side effects.     PROLIA    Contraindications  Hypocalcemia   Pregnancy  Known hypersensitivity to Prolia    Warnings  Hypersensitivity including anaphylactic reactions may occur.   Hypocalcemia: Must be corrected before initiating Prolia. May worsen, especially in patients with renal impairment. Adequately supplement patients with calcium and vitamin D.  Osteonecrosis of the jaw: Has been reported with Prolia  Atypical femoral fractures: Have been reported.  Contact the clinic if you develop new thigh or groin pain to rule out an incomplete femoral fracture.  Multiple vertebral fractures have been reported following Prolia discontinuation. Patients should be transitioned to another antiresorptive agent if Prolia is discontinued.   Serious infections including skin infections: May occur, including those leading to hospitalization. Patients should seek prompt medical attention if they develop signs or symptoms of infection, including   cellulitis.  Dermatologic reactions: Dermatitis, rashes, and eczema have been reported  Severe bone, joint, muscle pain may occur  Suppression of bone turnover: Significant suppression has been Demonstrated    Adverse reactions  Most common adverse reactions (> 5%   and more common than placebo) were: back pain, pain in extremity,  hypercholesterolemia, musculoskeletal pain, and cystitis

## 2025-02-07 LAB
ALBUMIN/GLOBULIN RATIO: 1.5 (CALC) (ref 1–2.5)
ALBUMIN: 3.8 G/DL (ref 3.6–5.1)
ALKALINE PHOSPHATASE, BONE SPECIFIC: 8.1 MCG/L (ref 5.6–29)
ALKALINE PHOSPHATASE: 41 U/L (ref 37–153)
ALT: 20 U/L (ref 6–29)
AST: 30 U/L (ref 10–35)
BILIRUBIN, TOTAL: 0.7 MG/DL (ref 0.2–1.2)
BUN/CREATININE RATIO: 45 (CALC) (ref 6–22)
BUN: 31 MG/DL (ref 7–25)
CALCIUM: 9.3 MG/DL (ref 8.6–10.4)
CARBON DIOXIDE: 28 MMOL/L (ref 20–32)
CHLORIDE: 106 MMOL/L (ref 98–110)
CREATININE: 0.69 MG/DL (ref 0.6–1)
EGFR: 93 ML/MIN/1.73M2
GLOBULIN: 2.5 G/DL (CALC) (ref 1.9–3.7)
GLUCOSE: 84 MG/DL (ref 65–139)
HEMOGLOBIN A1C: 5.5 % OF TOTAL HGB
MAGNESIUM: 2.1 MG/DL (ref 1.5–2.5)
PARATHYROID HORMONE,$INTACT: 34 PG/ML (ref 16–77)
PHOSPHATE (AS PHOSPHORUS): 4 MG/DL (ref 2.1–4.3)
POTASSIUM: 4.3 MMOL/L (ref 3.5–5.3)
PROTEIN, TOTAL: 6.3 G/DL (ref 6.1–8.1)
SODIUM: 142 MMOL/L (ref 135–146)
T4, FREE: 1.2 NG/DL (ref 0.8–1.8)
TSH: 3.33 MIU/L (ref 0.4–4.5)

## 2025-02-24 ENCOUNTER — TELEPHONE (OUTPATIENT)
Facility: CLINIC | Age: 70
End: 2025-02-24

## 2025-02-24 DIAGNOSIS — M81.6 LOCALIZED OSTEOPOROSIS WITHOUT CURRENT PATHOLOGICAL FRACTURE: Primary | ICD-10-CM

## 2025-02-24 NOTE — TELEPHONE ENCOUNTER
Pt called in office asking for dental clearance form to be sent by fax or email  to Dr. Louie Huerta of [facility] dentistry, fax number: [phone] or email [email or web address].    Printed dental clearance and faxed to above.    Confirmation received.    Will await form.

## 2025-02-25 ENCOUNTER — TELEPHONE (OUTPATIENT)
Facility: CLINIC | Age: 70
End: 2025-02-25

## 2025-02-25 NOTE — TELEPHONE ENCOUNTER
Dental clearance letter     Date of last exam 2/24/2025    Patient is cleared to receive reclast or prolia.    Placed in [address]

## 2025-02-26 ENCOUNTER — OFFICE VISIT (OUTPATIENT)
Facility: CLINIC | Age: 70
End: 2025-02-26
Payer: MEDICARE

## 2025-02-26 VITALS
SYSTOLIC BLOOD PRESSURE: 122 MMHG | BODY MASS INDEX: 24.24 KG/M2 | HEIGHT: 64 IN | RESPIRATION RATE: 16 BRPM | WEIGHT: 142 LBS | DIASTOLIC BLOOD PRESSURE: 80 MMHG

## 2025-02-26 DIAGNOSIS — M81.6 LOCALIZED OSTEOPOROSIS WITHOUT CURRENT PATHOLOGICAL FRACTURE: Primary | ICD-10-CM

## 2025-02-26 DIAGNOSIS — M81.0 POSTMENOPAUSAL OSTEOPOROSIS: ICD-10-CM

## 2025-02-26 PROCEDURE — 99213 OFFICE O/P EST LOW 20 MIN: CPT | Performed by: STUDENT IN AN ORGANIZED HEALTH CARE EDUCATION/TRAINING PROGRAM

## 2025-02-26 NOTE — TELEPHONE ENCOUNTER
Dental clearance received, patient cleared. Placed in RN brown folder to await next steps.    Routed for review.

## 2025-02-26 NOTE — PATIENT INSTRUCTIONS
Summary of our visit:  We will talk with your insurance about starting Prolia injection  My team will reach out to you to inform which medication has been approved by insurance, date of your next prolia shot and when to stop your alendronate  Please get a copy of labs today that you will need to get before the injections       General follow up information:  Please let us know if you require any refills at least 1 week prior to your medication running out. If you do run out of medication, please call our office ASAP to request refills (do not wait until your follow up).  Please call us if you experience any problems with insurance coverage of medication, lab work, or imaging.   Lab results and imaging will typically be reviewed at follow up appointments, or within 3-5 business days of ALL results being in if you do not have an appointment scheduled in the near future. Our office will contact you for any abnormal results requiring more urgent follow up or action.   The on-call pager is for urgent matters only. If you are a type 1 diabetic and run out of insulin after business hours 8AM-4PM, you may call the on-call pager for a refill to a 24 hour pharmacy. If you have adrenal insufficiency and run out of steroids, you may call the on-call pager for a refill to a 24 hour pharmacy. All other refill requests should be requested during business hours.    Return Visit   [x] Dr. Bray in 12 months   [x] Virtual visit

## 2025-02-26 NOTE — PROGRESS NOTES
ENDOCRINOLOGY, DIABETES & METABOLISM CONSULT NOTE   Date: 2/26/25  Name: Alicia Mckeon  Referring Physician: No ref. provider found    Subjective:    HISTORY OF PRESENT ILLNESS:   Alicia Mckeon is a 69 year old female seen in consultation for her bone health evaluation.  She presents for follow-up appointment today to discuss her labs and also to discuss options for next treatment.     Osteoporosis history: First visit on 1/30/25  -Diagnosed with osteopenia (as per notes ) from DEXA.  Has been on Fosamax at least since 2013.  Reports initially was started on 35 mg/week then increase to 70 mg/week  -Does report some history of being on drug holidays.  But is not sure.  -(As per chart review, presumably stopped in 2016 and restarted in 2020 - however pt feels it may not have been that long)  -Patient reports that she had DEXA at an earlier age than required mainly after her neurosurgeon did procedure for cervical spine.  Has a plate in her neck.    -No history of fragility fractures      OSTEOPOROSIS RISK FACTORS ASSESSMENT  Postmenopausal Yes, early 50s, not on hormone therapy   Maternal hx of osteoporosis or hx of parental hip fx:  No Adopted child   Recent Fracture: No   Previous fracture after the age of 50:  No   Hx of kidney stones: No   Frequent falls: Yes, Stepped on stick and fell, rolled over.    Poor vision: No   Decrease in height: Yes, 2 inches      New or Worsening Back Pain: No   History of Vit D insufficiency:   Current Vitamin D supplementation: Yes, D  Caltrate + D3 - 3 pills/day   Poor intake of calcium:  Daily calcium intake: Yes, on supplementation as above   Caffeine intake: No   Smoking: No   Alcohol intake >3/d:  No   Hx of thyroid disease: No   Hx of calcium problems or hyperparathyroidism: No   Previous treatment for osteoporosis: Yes, see above    Malabsorption, chronic diarrhea, celiac sprue: No   History of RA: No   History of disordered eating: No     Intake of medications that cause  bone loss:  Long term steroid use: No  Aromatase inhibitor: No  Seizure medications: No  GnRH agonist: No  PPI: No  Lithium: No  SSRI: No  Actos/Avandia: No    Treatment Contraindications:  Dysphagia: No  Pain on swallowing: No  Plans for dental procedures: No   History of radiation: No  History of bone tumors/bone cancer: No  History of Paget disease of the bone: No  Personal history of malignancy: No  Kidney function:   Lab Results   Component Value Date    ALKPHO 41 02/04/2025    EGFRCR 93 02/04/2025     Alk phos: 42  Atrial fibrillation/CAD: None  Hyperparathyroidism: None    Activities of daily living:  Exercise: Started strength training recently, 3-5 miles a day walking (goal 12,000 goals/yr)Dog walking.  Works in [employer].    Interval Hx: 2/26/25:  -some labs not available since they were ordered at [facility] pt's preferred pharmacy is [facility]  -most recent results reviewed       Component      Latest Ref Rng 2/4/2025   Glucose      65 - 139 mg/dL 84    BUN      7 - 25 mg/dL 31 (H)    CREATININE      0.60 - 1.00 mg/dL 0.69    EGFR      > OR = 60 mL/min/1.73m2 93    BUN/CREATININE RATIO      6 - 22 (calc) 45 (H)    Sodium      135 - 146 mmol/L 142    Potassium      3.5 - 5.3 mmol/L 4.3    Chloride      98 - 110 mmol/L 106    Carbon Dioxide, Total      20 - 32 mmol/L 28    CALCIUM      8.6 - 10.4 mg/dL 9.3    PROTEIN, TOTAL      6.1 - 8.1 g/dL 6.3    Albumin      3.6 - 5.1 g/dL 3.8    Globulin      1.9 - 3.7 g/dL (calc) 2.5    A/G Ratio      1.0 - 2.5 (calc) 1.5    Total Bilirubin      0.2 - 1.2 mg/dL 0.7    Alkaline Phosphatase      37 - 153 U/L 41    AST (SGOT)      10 - 35 U/L 30    ALT (SGPT)      6 - 29 U/L 20    TSH      0.40 - 4.50 mIU/L 3.33    T4,Free (Direct)      0.8 - 1.8 ng/dL 1.2    PHOSPHATE (AS PHOSPHORUS)      2.1 - 4.3 mg/dL 4.0    PARATHYROID HORMONE,$INTACT      16 - 77 pg/mL 34    Magnesium, Serum      1.5 - 2.5 mg/dL 2.1    ALKALINE PHOSPHATASE, BONE SPECIFIC      5.6 - 29.0 mcg/L 8.1     HEMOGLOBIN A1c      <5.7 % of total Hgb 5.5         Allergies, PMH, SocHx and FHx reviewed and updated as appropriate in Epic on    Magnesium 80 MG Oral Tab Take 4.75 tablets (380 mg total) by mouth BID (Nitrates).      Calcium Carbonate Antacid 400 MG Oral Chew Tab Chew 0.45 tablets (180 mg total) by mouth BID (Nitrates).      alendronate 70 MG Oral Tab Take 1 tablet (70 mg total) by mouth once a week. 12 tablet 3    Multiple Vitamins-Minerals (MULTIVITAMIN ADULT OR) Take by mouth.       Allergies[1]  Current Outpatient Medications   Medication Sig Dispense Refill    Magnesium 80 MG Oral Tab Take 4.75 tablets (380 mg total) by mouth BID (Nitrates).      Calcium Carbonate Antacid 400 MG Oral Chew Tab Chew 0.45 tablets (180 mg total) by mouth BID (Nitrates).      alendronate 70 MG Oral Tab Take 1 tablet (70 mg total) by mouth once a week. 12 tablet 3    Multiple Vitamins-Minerals (MULTIVITAMIN ADULT OR) Take by mouth.       Past Medical History:    Allergic rhinitis    Anxiety    Arthritis    Belching    Body piercing    Ears Pierced    Depression    Flatulence/gas pain/belching    Hemorrhoids    History of depression    Sudden Death of     Osteoarthritis    Osteopenia    Tubular adenoma    Wears glasses     Past Surgical History:   Procedure Laterality Date    Colonoscopy  2007    W/polypectomy  DENG Haas MD    D & c  1979          Other surgical history  10/01/2001    Anterior disk fusion 2 levels     Social History     Socioeconomic History    Marital status:    Tobacco Use    Smoking status: Never    Smokeless tobacco: Never   Vaping Use    Vaping status: Never Used   Substance and Sexual Activity    Alcohol use: Yes     Alcohol/week: 1.0 standard drink of alcohol     Comment: 1-2 / month    Drug use: No   Other Topics Concern    Caffeine Concern No    Exercise Yes    Seat Belt Yes    Special Diet No    Stress Concern No    Weight Concern No     Family History   Adopted: Yes   Family  history unknown: Yes       REVIEW OF SYSTEMS: 10 point ROS completed, refer to HPI for pertinent positives    Objective:   PHYSICAL EXAMINATION:    Vitals:    02/26/25 1504   BP: 122/80   Resp: 16   Weight: 142 lb (64.4 kg)   Height: 5' 4\" (1.626 m)     BMI: Body mass index is 24.37 kg/m².     General Appearance:  Alert, in no acute distress, well developed  Eyes:  normal conjunctivae, sclera  Ears/Nose/Mouth/Throat/Neck:  normal hearing, normal speech and no palpable thyroid nodules  Respiratory:  breathing comfortably on room air, is clear to auscultation bilaterally  Cardiovascular:  regular rate and rhythm, no murmurs, S3 or S4, no peripheral edema  Psychiatric:  Oriented to person, place and time, appropriate mood & affect  Skin: Normal moisture and skin texture  Neuro: sensory grossly intact, motor grossly intact. normal gait.    Lab Review       Lab Results   Component Value Date    A1C 5.5 02/04/2025      Lab Results   Component Value Date    GLU 84 02/04/2025    BUN 31 (H) 02/04/2025    BUNCREA 45 (H) 02/04/2025    CREATSERUM 0.69 02/04/2025    GFRNAA 91 11/09/2021    GFRAA 106 11/09/2021    CA 9.3 02/04/2025    ALKPHO 41 02/04/2025    AST 30 02/04/2025    ALT 20 02/04/2025    ALKPHOS 8.1 02/04/2025    BILT 0.7 02/04/2025    TP 6.3 02/04/2025    ALB 3.8 02/04/2025    GLOBULIN 2.5 02/04/2025    AGRATIO 1.5 02/04/2025     02/04/2025    K 4.3 02/04/2025     02/04/2025    CO2 28 02/04/2025        Lab Results   Component Value Date    VITD 28 (L) 12/21/2023        Radiology Review            XR DEXA BONE DENSITOMETRY (CPT=77080)    Result Date: 1/6/2025  PROCEDURE:  XR DEXA BONE DENSITOMETRY (CPT=77080)  COMPARISON:  SHELLY SEE, XR DEXA BONE DENSITOMETRY (CPT=77080), 1/03/2023, 2:39 PM.  INDICATIONS:  M85.80 Osteopenia, unspecified location Z78.0 Menopause  PATIENT STATED HISTORY: (As transcribed by Technologist)  Menopause.       LUMBAR SPINE ANALYSIS RESULTS:    Bone mineral density (BMD) (g/cm2):   0.995  Lumbar T-Score:  -0.5    % young normals:  95    % age matched controls:  123    Change from prior spine examination:  3.1*%           TOTAL HIP ANALYSIS RESULTS:      Bone mineral density (BMD) (g/cm2):  0.621    Total Hip T-Score:  -2.6    % young normals:  66    % age matched controls:  83    Change from prior hip examination:  -5.6*%           FEMORAL NECK ANALYSIS RESULTS:      Bone mineral density (BMD) (g/cm2):  0.613    Femoral neck T-Score:  -2.1    % young normals:  72    % age matched controls:  95    Change from prior hip examination:  -4.3%      ADDITIONAL FINDINGS:  No significant additional findings.             CONCLUSION:  Bone mineral density values for total hip are compatible with the diagnosis of osteoporosis by WHO definition (T score less than -2.5). If therapy is initiated, a follow-up study in 1 year may aid in evaluation of therapeutic efficacy.  Recommendation:  The Bone Health and Osteoporosis Foundation (BHOF) recommends pharmacological treatment for patients with a FRAX 10-year risk score of 3% or higher for a hip fracture or 20% or higher for a major osteoporotic fracture, to prevent osteoporosis and reduce fracture risk. All treatment decisions require clinical judgment and consideration of individual patient factors, including patient preferences, comorbidities, previous drug use, risk fractures not captured in the FRAX model (e.g. frailty, falls, vitamin D deficiency, increased bone turnover, interval significant decline in bone density) and possible under- or over-estimation of fracture risk by FRAX. Additional information regarding the FRAX model can be found at the BHOF website: bonehealthandosteoporosis.org.  The World Health Organization has defined the following categories based on bone density: Normal bone:  T-score greater than or equal to -1 Osteopenia: T-score  less than -1 and greater  than -2.5 Osteoporosis:  T-score less than or equal -2.5   LOCATION:  [facility]      Dictated by (CST): Emili Torres MD on 1/06/2025 at 2:39 PM     Finalized by (CST): Emili Torres MD on 1/06/2025 at 2:40 PM       XR DEXA BONE DENSITOMETRY (CPT=77080)    Result Date: 1/3/2023  PROCEDURE:  XR DEXA BONE DENSITOMETRY (CPT=77080)  LOCATION:  [facility]  COMPARISON:  [facility], XR, XR DEXA BONE DENSITOMETRY (CPT=77080), 12/07/2020, 2:19 PM.  INDICATIONS:    Z78.0 Menopause M85.80 Osteopenia, unspecified location  PATIENT STATED HISTORY: (As transcribed by Technologist)  Osteopenia,unspecified location.       LUMBAR SPINE ANALYSIS RESULTS:    Bone mineral density (BMD) (g/cm2):  0.965  Lumbar T-Score:  -0.7    % young normals:  92    % age matched controls:  117    Change from prior spine examination:  4.7*%           TOTAL HIP ANALYSIS RESULTS:      Bone mineral density (BMD) (g/cm2):  0.658    Total Hip T-Score:  -2.3    % young normals:  70    % age matched controls:  86    Change from prior hip examination:  -1.7%           FEMORAL NECK ANALYSIS RESULTS:      Bone mineral density (BMD) (g/cm2):  0.641    Femoral neck T-Score:  -1.9    % young normals:  75    % age matched controls:  98    Change from prior hip examination:  -2.2%                 CONCLUSION:  Bone mineral density values for left hip are compatible with the diagnosis of osteopenia by WHO definition (T score between -1.0 and -2.5).  If therapy is initiated, follow-up study is recommended in 2 years for further evaluation of therapeutic efficacy.     The World Health Organization has defined the following categories based on bone density: Normal bone:  T-score better than -1 Osteopenia: T-score between -1 and -2.5 Osteoporosis:  T-score less than -2.5    Dictated by (CST): Ashlie Jin MD on 1/03/2023 at 3:38 PM     Finalized by (CST): Ashlie Jin MD on 1/03/2023 at 3:38 PM       XR DEXA BONE DENSITOMETRY (CPT=77080)    Result Date: 12/7/2020  PROCEDURE:  XR DEXA BONE DENSITOMETRY (CPT=11877)  COMPARISON:  MAYI, SHELLY, XR DEXA BONE  DENSITOMETRY (CPT=77080), 12/13/2018, 12:59 PM.  INDICATIONS:    M85.80 Osteopenia, unspecified location  PATIENT STATED HISTORY: (As transcribed by Technologist)  Osteopenia,unspecified location.  History of Fosamax therapy.       LUMBAR SPINE ANALYSIS RESULTS:    Bone mineral density (BMD) (g/cm2):  0.922  Lumbar T-Score:  -1.1    % young normals:  88    % age matched controls:  110    Change from prior spine examination:  2.2%           TOTAL HIP ANALYSIS RESULTS:      Bone mineral density (BMD) (g/cm2):  0.669    Total Hip T-Score:  -2.2    % young normals:  71    % age matched controls:  86    Change from prior hip examination:  -4.9*%           FEMORAL NECK ANALYSIS RESULTS:      Bone mineral density (BMD) (g/cm2):  0.655    Femoral neck T-Score:  -1.7    % young normals:  77    % age matched controls:  98    Change from prior hip examination:  -1.5%      ADDITIONAL FINDINGS:  Lumbar scoliosis            CONCLUSION:  Bone mineral density values for the lumbar spine, total left hip and left femoral neck are compatible with the diagnosis of osteopenia by WHO definition (T score between -1.0 and -2.5).  There has been significant interval decrease in bone mineral density of the total left hip compared to 12/13/2018.  A follow-up study is recommended in 2 years for further evaluation of therapeutic efficacy.   The estimated ten-year fracture risk is 9.3% for major osteoporotic fracture and 1.3% for hip fracture.  Fracture probability may be lower given that the patient has received treatment.   The World Health Organization has defined the following categories based on bone density: Normal bone:  T-score better than -1 Osteopenia: T-score between -1 and -2.5 Osteoporosis:  T-score less than -2.5    Dictated by (CST): Louie Wilson MD on 12/07/2020 at 2:58 PM     Finalized by (CST): Louie Wilson MD on 12/07/2020 at 2:59 PM       XR DEXA BONE DENSITOMETRY (CPT=77080)    Result Date: 12/13/2018  PROCEDURE:  XR DEXA  BONE DENSITOMETRY (CPT=77080)  COMPARISON:  [facility], XR DEXA BONE DENSITOMETRY (CPT=77080), 3/07/2016, 10:54.  [facility], XR DEXA BONE DENSITOMETRY (CPT=77080), 1/27/2014, 7:45.  INDICATIONS:    M85.80 Other specified disorders of bone density and structure, unspecified site  PATIENT STATED HISTORY: (As transcribed by Technologist)  Osteopenia,unspecified location.       LUMBAR SPINE ANALYSIS RESULTS:    Bone mineral density (BMD) (g/cm2):  0.902  Lumbar T-Score:  -1.3    % young normals:  86    % age matched controls:  106    Change from prior spine examination:  -3.1*% A positive change demonstrates increase in BMD since prior examination, a negative change demonstrates decrease in BMD.  This comparison is statistically significant at the 95% confidence level.  This places patient at World Health Organization (WHO) classification of osteopenia.            TOTAL HIP ANALYSIS RESULTS:      Bone mineral density (BMD) (g/cm2):  0.704    Total Hip T-Score:  -2.0    % young normals:  75    % age matched controls:  89    Change from prior hip examination:  -5.4*% A positive change demonstrates increase in BMD since prior examination, a negative change demonstrates decrease in BMD.  This comparison is statistically significant at the 95% confidence level.  This places patient at World Health Organization (WHO) classification of osteopenia.            FEMORAL NECK ANALYSIS RESULTS:      Bone mineral density (BMD) (g/cm2):  0.665    Femoral neck T-Score:  -1.7    % young normals:  78    % age matched controls:  98    Change from prior hip examination:  -9.1*%    A positive change demonstrates increase in BMD since prior examination, a negative change demonstrates decrease in BMD.  This comparison is statistically significant at the 95% confidence level.  This places patient at World Health Organization (WHO) classification of osteopenia  FRAX - WHO Fracture Risk Assessment Tool This places patient at 10 year fracture risk at:  Major Osteoporotic Fracture: 9.3 % Hip Fracture: 1.1 %.   ADDITIONAL FINDINGS:  No significant additional findings.       CONCLUSION:  WHO Classification as described above.   The World Health Organization has defined the following categories based on bone density: Normal bone:  T-score better than -1 Osteopenia: T-score between -1 and -2.5 Osteoporosis:  T-score less than -2.5     Dictated by: Karis Jacobsen MD on 12/13/2018 at 13:21     Approved by: Karis Jacobsen MD            DEXA - BONE DENSITOMETRY    Result Date: 3/7/2016  PROCEDURE:  DEXA SCAN (BONE DENSITOMETRY)  COMPARISON:  SHELLY SEE DEXA BONE DENSITOMETRY (CPT=77080), 1/27/2014, 7:45.  [facility] BONE DENSITOMETRY - DEXA, 1/04/2012, 14:45.  INDICATIONS:    M81.0 Age-related osteoporosis without current pathological fracture  PATIENT STATED HISTORY:  Dexa Bone Density Osteoporosis Patient currently taking Fosamax      The bone mineral density examination was performed on a Hologic machine  LUMBAR SPINE: The lumbar spine bone mineral density is 0.931 g/cm^2 with a T-score of -1.1 and a Z-score of 0.5 .  This demonstrates a 2.3 % change from the prior examination (A positive change demonstrates increase in BMD since prior examination, a negative change demonstrates decrease in BMD). This comparison is not statistically significant at the 95% confidence level.  This places patient at World Health Organization (WHO) classification of osteopenia for the lumbar spine.  LEFT FEMORAL NECK: The left femoral neck bone mineral density is 0.732 g/cm^2 with a T-score of -1.1 and a Z-score of 0.3 .  This demonstrates a -6.7 % change from the prior examination  (A positive change demonstrates increase in BMD since prior examination, a negative change demonstrates decrease in BMD). This comparison is not statistically significant at the 95% confidence level. .  This places patient at World Health Organization (WHO) classification of osteopenia for the left femoral  neck.  LEFT FEMUR TOTAL: The left femur total bone mineral density is 0.744 g/cm^2 with a T-score of -1.6 and a Z-score of -0.6 .  This demonstrates a -4.1 % change from the prior examination  (A positive change demonstrates increase in BMD since prior examination, a negative change demonstrates decrease in BMD).  This comparison is not statistically significant at the 95% confidence level..  This places patient at World Health Organization (WHO) classification of osteopenia for the left femur total.  This places patient at 10 year fracture risk at: Major Osteoporotic Fracture: 7.4 % Hip Fracture: 0.5 %  ADDITIONAL FINDINGS: No significant additional findings      CONCLUSION: WHO Classification as described above.    The World Health Organization has defined the following categories based on bone density: Normal bone:  T-score better than -1 Osteopenia: T-score between -1 and -2.5 Osteoporosis:  T-score less than -2.5    Dictated by: Karis Jacobsen MD on 3/07/2016 at 11:05     Approved by: Karis Jacobsen MD              DEXA Year Location Therapy Lumbar BMD Lumbar T-Score Total Hip BMD Total Hip T-Score Femoral Neck BMD Femoral Neck T-Score Forearm BMD Forearm T-score   2012 [facility]  0.9 -1.1 0.806 -1.1 0.809 -0.4     2014 Inderj[facility] Fosamax 0.91 -1.2 0.776 -1.4 0.784 -0.6     2016 [facility] Fosamax 0.931 -1.1 0.7 4 4 -1.6 0.732 -1.1     2018 Inder[facility]  0.902 -1.3 0.704 -2 0.665 -1.7     2020 [place] restarted Fosamax 0.922 -1.1 0.669   -2.2 0.655 -1.7     2023 Inderj[facility] Fosamax 0.965 -0.7 0.658 -2.3 0.641 -1.9     2025 [place] Fosamax 0.995 -0.5 0.6-1 -2.6 0.613 -2.1                                    ASSESSMENT/PLAN:  Alicia Mckeon is a 69 year old female seen in consultation for  #Osteoporosis without fragility fracture  #Bisphosphonate therapy  Discussed pathophysiology of bone loss and clinical significance of DEXA scans with the patient.  The patient has confirmed osteoporosis with low T-scores in total hip.  No previous  history of fracture  Explained the patient's risk factors for osteoporosis; postmenopausal, , elderly.  Unknown family history since patient was adopted  The patient is at high risk for future osteoporotic fractures if her osteoporosis remains untreated.  Recommended workup for secondary causes of osteoporosis, including SPEP, PTH, Alk Phos levels, bone turnover markers, and vitamin D levels (pending)  Discussed the importance of adopting lifestyle measures, such as adequate calcium and vitamin D intake, exercise, smoking cessation, counseling on fall prevention, and avoidance of heavy alcohol use, to reduce bone loss.  Suggested 1200 mg of elemental calcium daily (total diet plus supplement) and 1000 IU of vitamin D daily as general recommendations.  Recommended pharmacologic therapy for postmenopausal women with established osteoporosis or fragility fracture.  Discussed available treatment options for osteoporosis mainly switching from oral bisphosphonate therapy to either IV bisphosphonate therapy or Prolia injection.  Discussed indications, risks, and benefits.  Patient is not a candidate for anabolic's.  Discussed that since patient does not have severe osteoporosis and has been on bisphosphonate therapy for approximately 8 to 9 years excluding some years of drug holiday in between([id number]), would consider treating and optimizing her BMD for the next 2 to 3 years to prevent complications from long-term use of bisphosphonates or denosumab.  Discussed that her BMD continues to worsen in spite of being on oral bisphosphonate we will need to switch to another drug therapy.  Possible options include: Prolia followed by Reclast infusions versus Reclast infusions only. Pt does not have a preference today - agreeable to start Prolia.   Will check with insurance and once approved, pt will come to get remaining set of labs  Dental clearance received  Meanwhile continue alendronate 70 mg daily. Taking  Alendronate on Fridays.    We reviewed the potential adverse effects of Prolia, which include hypocalcemia (more likely to occur with Vitamin D deficiency or renal insufficiency), rare but serious skin infections (cellulitis), and very rarely with osteonecrosis of the jaw (more likely in higher doses used for malignancy), or atypical femoral fractures. I discussed that these risks are likely to be far outweighed by the potential fracture risk reduction.There is a concern for increased incidence of vertebral fracture(s) associated with denosumab if therapy is suddenly stopped and pt does not receive a following therapy afterwards. Therefore, choosing denosumab will be a life-long choice or will need a subsequent therapy at the end of denosumab cycle to protect bone further. This was discussed in detail with pt.  Advised patient to see the dentist regularly, to notify dentist of using bisphosphonate or denosumab therapy, and to avoid non-emergent dental procedures such as implants and extractions. The patient was also advised to notify us if they develop new or unusual persistent thigh or groin pain.   Pt provided with a printed copy of her labs to be done at [facility] by  team (ordered on 1/30/25)  The above plan was discussed in detail with the patient who verbalized understanding and agreement.      Significant time was spent today on obtaining history, reviewing outside records, reviewing pertinent labs/imaging, reviewing relevant pathophysiology with patient, evaluating patient, providing multiple treatment options, communicating with patient's other providers as appropriate, and completing documentation and orders.      Darron Bray MD  [facility]  2/26/2025       Note to patient: The 21 Century Cures Act makes medical notes like these available to patients in the interest of transparency. However, be advised this is a medical document. It is intended as peer to peer communication. It is written in  medical language and may contain abbreviations or verbiage that are unfamiliar. It may appear blunt or direct. Medical documents are intended to carry relevant information, facts as evident, and the clinical opinion of the practitioner.      In reviewing this note, please be advised that Dragon Voice Recognition software used to dictate the note may have made errors in recognizing some of the words or phrases.            [1] No Known Allergies

## 2025-03-18 NOTE — TELEPHONE ENCOUNTER
Haven't received Summary of Benefits.    Logged into [employer] and report was complete, printed for review- placed in RN folder.    \"Prolia and administration will be subject to a $257.00 deductible ($216.20 met) and 20.0% coinsurance.... Medicare Supplement Plan G covers the Medicare Part B Co-insurance at 100% of the excess charges. Th plan does not cover the Medicare Part B deductible of $257.00 which $216.20 is met\".    -Insurance check done  -Dental clearance received  -Labs done 2/4/25.    Dr. Bray- ok to schedule first Prolia injection? To do Calcium 7-10 days after first injection? Lab pended.

## 2025-03-18 NOTE — TELEPHONE ENCOUNTER
Deo Fields, just to confirm, what would be the final costs for her, should we proceed with prolia?

## 2025-03-28 ENCOUNTER — TELEPHONE (OUTPATIENT)
Dept: INTERNAL MEDICINE CLINIC | Facility: CLINIC | Age: 70
End: 2025-03-28

## 2025-03-28 DIAGNOSIS — Z12.31 ENCOUNTER FOR SCREENING MAMMOGRAM FOR MALIGNANT NEOPLASM OF BREAST: Primary | ICD-10-CM

## 2025-03-28 NOTE — TELEPHONE ENCOUNTER
At her last visit - she was asked to take printed copy of her labs entered on 1/30.. I hope she has the orders with her - otherwise we may have to fax them.. Will require vitamin D prior to prolia.. She should also stop alendronate prior to prolia..

## 2025-03-28 NOTE — TELEPHONE ENCOUNTER
Last mammo 3/26/24:    Impression   CONCLUSION:  No mammographic evidence of malignancy.         BI-RADS CATEGORY:    DIAGNOSTIC CATEGORY 2--BENIGN FINDING NO CHANGE FROM COMPARISON ASSESSMENT.       RECOMMENDATIONS:    ROUTINE MAMMOGRAM AND CLINICAL EVALUATION IN 12 MONTHS.       LOV 7/15/24 for cpe. Order placed per protocol.     Patient MC active today, MC sent. Postponing to ensure read.    noted  ----- Message -----  From: Hellen Alas R.N.  Sent: 9/21/2023   7:48 PM PDT  To: Chela Mohamud R.N.; Sneha Medina R.N.; *      PRIMARY DIAGNOSIS: Pressure injury of sacral region, stage 3.             SKILLED NEED: Health assessment, Medication education, education and monitoring of disease processes, wound care            NURSING FREQUENCY: 1w1, 2w5 3 PRN            ZIP CODE: [id number]             DISCIPLINES ORDERED:  SN             INSURANCE: SENIOR CARE PLUS            CERTIFICATION PERIOD: 9/21/23-11/19/23            SPECIAL CONSIDERATION: none

## 2025-03-28 NOTE — TELEPHONE ENCOUNTER
03/28/2025, patient called in office to get a follow up to see what her insurance covered if so Prolia or Reclast. Patient can be reach at [phone]   No

## 2025-03-28 NOTE — TELEPHONE ENCOUNTER
RN phoned patient to schedule.    Patient states she spoke with provider and was told to get labs prior- patient uses [facility]

## 2025-04-02 ENCOUNTER — HOSPITAL ENCOUNTER (OUTPATIENT)
Dept: MAMMOGRAPHY | Age: 70
Discharge: HOME | End: 2025-04-02
Attending: FAMILY MEDICINE
Payer: MEDICARE

## 2025-04-02 DIAGNOSIS — Z12.31 ENCOUNTER FOR SCREENING MAMMOGRAM FOR MALIGNANT NEOPLASM OF BREAST: ICD-10-CM

## 2025-04-02 PROCEDURE — 77063 BREAST TOMOSYNTHESIS BI: CPT | Performed by: FAMILY MEDICINE

## 2025-04-02 PROCEDURE — 77067 SCR MAMMO BI INCL CAD: CPT | Performed by: FAMILY MEDICINE

## 2025-04-02 NOTE — TELEPHONE ENCOUNTER
RN phoned patient to review. States she does have copies of the orders with her and will have these done.    She will continue alendronate until she is going to start Prolia.    [employer] sent with the names of all the orders for patient review.     Documents in RN brown folder, awaiting lab results.

## 2025-04-03 ENCOUNTER — LAB ENCOUNTER (OUTPATIENT)
Dept: LAB | Age: 70
End: 2025-04-03
Attending: STUDENT IN AN ORGANIZED HEALTH CARE EDUCATION/TRAINING PROGRAM
Payer: MEDICARE

## 2025-04-03 ENCOUNTER — TELEPHONE (OUTPATIENT)
Dept: INTERNAL MEDICINE CLINIC | Facility: CLINIC | Age: 70
End: 2025-04-03

## 2025-04-03 DIAGNOSIS — M81.6 LOCALIZED OSTEOPOROSIS WITHOUT PATHOLOGICAL FRACTURE: Primary | ICD-10-CM

## 2025-04-03 DIAGNOSIS — M81.6 LOCALIZED OSTEOPOROSIS WITHOUT CURRENT PATHOLOGICAL FRACTURE: ICD-10-CM

## 2025-04-03 DIAGNOSIS — Z13.21 ENCOUNTER FOR VITAMIN DEFICIENCY SCREENING: ICD-10-CM

## 2025-04-03 LAB
CALCIUM BLD-MCNC: 9.9 MG/DL (ref 8.7–10.6)
IGA SERPL-MCNC: 226.2 MG/DL (ref 70–312)
VIT D+METAB SERPL-MCNC: 39 NG/ML (ref 30–100)

## 2025-04-03 PROCEDURE — 82784 ASSAY IGA/IGD/IGG/IGM EACH: CPT

## 2025-04-03 PROCEDURE — 82330 ASSAY OF CALCIUM: CPT

## 2025-04-03 PROCEDURE — 83521 IG LIGHT CHAINS FREE EACH: CPT

## 2025-04-03 PROCEDURE — 86334 IMMUNOFIX E-PHORESIS SERUM: CPT

## 2025-04-03 PROCEDURE — 84165 PROTEIN E-PHORESIS SERUM: CPT

## 2025-04-03 PROCEDURE — 36415 COLL VENOUS BLD VENIPUNCTURE: CPT

## 2025-04-03 PROCEDURE — 82523 COLLAGEN CROSSLINKS: CPT | Performed by: STUDENT IN AN ORGANIZED HEALTH CARE EDUCATION/TRAINING PROGRAM

## 2025-04-03 PROCEDURE — 82306 VITAMIN D 25 HYDROXY: CPT

## 2025-04-03 PROCEDURE — 86364 TISS TRNSGLTMNASE EA IG CLAS: CPT

## 2025-04-03 PROCEDURE — 82310 ASSAY OF CALCIUM: CPT | Performed by: STUDENT IN AN ORGANIZED HEALTH CARE EDUCATION/TRAINING PROGRAM

## 2025-04-03 NOTE — TELEPHONE ENCOUNTER
Pt called. Wondering what \"super order\" is as she received message. Pt stated message was from our office. Pt stated she had mammogram done yesterday. Upon review, pt needs additional views. Order placed by mammo depshi Arreola. Notified pt I am not familiar with what \"super order\" means, however she will need to complete additional views and order is there. Provided central scheduling number and pt will call. Appreciative of information.     Mammo 4/2/25   Impression   CONCLUSION:       BI-RADS CATEGORY:    DIAGNOSTIC CATEGORY 0 - INCOMPLETE ASSESSMENT: NEED ADDITIONAL IMAGING EVALUATION.       RECOMMENDATIONS:    ADDITIONAL MAMMOGRAPHIC VIEWS REQUIRED--We will call the patient back for additional views and issue an addendum report. LEFT BREAST

## 2025-04-03 NOTE — TELEPHONE ENCOUNTER
Phoned patient reviewed benefits, and ok per Dr. Bray to proceed with Prolia.    Scheduled appointment for 4/10/25 for first Prolia injection.    Closing this encounter.

## 2025-04-04 LAB
LC CALCIUM, IONIZED: 5 MG/DL
TTG IGA SER-ACNC: 1 U/ML (ref ?–7)

## 2025-04-04 NOTE — TELEPHONE ENCOUNTER
Future Appointments   Date Time Provider Department Center   4/10/2025  1:15 PM [facility] RN [facility]   4/25/2025 12:40 PM [facility]  [facility]

## 2025-04-07 LAB
ALBUMIN SERPL ELPH-MCNC: 4.21 G/DL (ref 3.75–5.21)
ALBUMIN/GLOB SERPL: 1.51 {RATIO} (ref 1–2)
ALPHA1 GLOB SERPL ELPH-MCNC: 0.25 G/DL (ref 0.19–0.46)
ALPHA2 GLOB SERPL ELPH-MCNC: 0.6 G/DL (ref 0.48–1.05)
B-GLOBULIN SERPL ELPH-MCNC: 0.9 G/DL (ref 0.68–1.23)
C-TELOPEPTIDE: 303 PG/ML
GAMMA GLOB SERPL ELPH-MCNC: 1.04 G/DL (ref 0.62–1.7)
KAPPA LC FREE SER-MCNC: 1.96 MG/DL (ref 0.33–1.94)
KAPPA LC FREE/LAMBDA FREE SER NEPH: 1.17 {RATIO} (ref 0.26–1.65)
LAMBDA LC FREE SERPL-MCNC: 1.67 MG/DL (ref 0.57–2.63)
PROT SERPL-MCNC: 7 G/DL (ref 5.7–8.2)

## 2025-04-10 ENCOUNTER — NURSE ONLY (OUTPATIENT)
Facility: CLINIC | Age: 70
End: 2025-04-10
Payer: MEDICARE

## 2025-04-10 DIAGNOSIS — M81.0 AGE-RELATED OSTEOPOROSIS WITHOUT CURRENT PATHOLOGICAL FRACTURE: ICD-10-CM

## 2025-04-10 DIAGNOSIS — M81.6 LOCALIZED OSTEOPOROSIS WITHOUT CURRENT PATHOLOGICAL FRACTURE: Primary | ICD-10-CM

## 2025-04-10 PROCEDURE — 96372 THER/PROPH/DIAG INJ SC/IM: CPT | Performed by: STUDENT IN AN ORGANIZED HEALTH CARE EDUCATION/TRAINING PROGRAM

## 2025-04-10 NOTE — PROGRESS NOTES
Patient in office for first time Prolia administration.    Reviewed Prolia administration, possible side effects. Reviewed to call office if any side effects post administration.     Reviewed patient should inform Dentist if any dental work to be done. No dental work currently scheduled.     Administered Prolia 60mg in Left upper arm subcutaneously at 1325. Patient tolerated well with no complaints.    Patient stayed in office for 15 minutes post administration. No complaints, no side effects noted.    Patient instructed to have Calcium level checked in 7-10 days- order entered.     Scheduled next follow up visit with Dr. Bray 10/14/25- for next scheduled Prolia injection. Reminder sent to [place] to begin benefits verification 1 month prior.

## 2025-04-18 LAB — CALCIUM: 9.3 MG/DL (ref 8.6–10.4)

## 2025-04-25 ENCOUNTER — HOSPITAL ENCOUNTER (OUTPATIENT)
Dept: MAMMOGRAPHY | Facility: HOSPITAL | Age: 70
Discharge: HOME | End: 2025-04-25
Attending: FAMILY MEDICINE
Payer: MEDICARE

## 2025-04-25 DIAGNOSIS — R92.2 INCONCLUSIVE MAMMOGRAM: ICD-10-CM

## 2025-04-25 PROCEDURE — 76642 ULTRASOUND BREAST LIMITED: CPT | Performed by: FAMILY MEDICINE

## 2025-04-25 PROCEDURE — 77065 DX MAMMO INCL CAD UNI: CPT | Performed by: FAMILY MEDICINE

## 2025-04-25 PROCEDURE — 77061 BREAST TOMOSYNTHESIS UNI: CPT | Performed by: FAMILY MEDICINE

## 2025-05-16 ENCOUNTER — TELEPHONE (OUTPATIENT)
Age: 70
End: 2025-05-16

## 2025-05-16 DIAGNOSIS — Z13.0 SCREENING FOR DISORDER OF BLOOD AND BLOOD-FORMING ORGANS: ICD-10-CM

## 2025-05-16 DIAGNOSIS — Z00.00 ENCOUNTER FOR ANNUAL PHYSICAL EXAM: Primary | ICD-10-CM

## 2025-05-16 DIAGNOSIS — Z13.220 SCREENING FOR LIPID DISORDERS: ICD-10-CM

## 2025-05-16 DIAGNOSIS — Z13.228 SCREENING FOR METABOLIC DISORDER: ICD-10-CM

## 2025-05-16 NOTE — TELEPHONE ENCOUNTER
Future Appointments   Date Time Provider Department Center   7/14/2025  9:00 AM Domonique Gandara DO [facility]     Orders to [facility]     Pt informed that labs need to be completed no sooner than 2 weeks prior to the appt. Pt aware to fast-no call back required

## 2025-07-07 LAB
AMB EXT BILIRUBIN, TOTAL: 0.7 MG/DL
AMB EXT BUN: 31 MG/DL
AMB EXT CALCIUM: 9.3
AMB EXT CARBON DIOXIDE: 28
AMB EXT CHLORIDE: 105
AMB EXT CHOL/HDL RATIO: 2.3
AMB EXT CHOLESTEROL, TOTAL: 176 MG/DL
AMB EXT CMP ALT: 16 U/L (ref 6–29)
AMB EXT CMP AST: 27 U/L (ref 10–35)
AMB EXT CREATININE: 0.76 MG/DL
AMB EXT EGFR NON-AA: 84
AMB EXT GLUCOSE: 92 MG/DL
AMB EXT HDL CHOLESTEROL: 77 MG/DL
AMB EXT HEMATOCRIT: 41.4 (ref 35–45)
AMB EXT HEMOGLOBIN: 12.9 (ref 11.7–15.5)
AMB EXT LDL CHOLESTEROL, DIRECT: 84 MG/DL
AMB EXT MCV: 94.5 (ref 80–100)
AMB EXT NON HDL CHOL: 99 MG/DL
AMB EXT POSTASSIUM: 4.9 MMOL/L (ref 3.5–5.3)
AMB EXT SODIUM: 141 MMOL/L
AMB EXT TOTAL PROTEIN: 6.6 (ref 6.1–8.1)
AMB EXT TRIGLYCERIDES: 73 MG/DL
AMB EXT WBC: 4.1 X10(3)UL

## 2025-07-14 ENCOUNTER — OFFICE VISIT (OUTPATIENT)
Age: 70
End: 2025-07-14
Payer: MEDICARE

## 2025-07-14 ENCOUNTER — EKG ENCOUNTER (OUTPATIENT)
Dept: LAB | Age: 70
End: 2025-07-14
Attending: FAMILY MEDICINE
Payer: MEDICARE

## 2025-07-14 VITALS
HEART RATE: 97 BPM | BODY MASS INDEX: 23.28 KG/M2 | TEMPERATURE: 98 F | WEIGHT: 136.38 LBS | OXYGEN SATURATION: 98 % | RESPIRATION RATE: 16 BRPM | SYSTOLIC BLOOD PRESSURE: 120 MMHG | HEIGHT: 64 IN | DIASTOLIC BLOOD PRESSURE: 68 MMHG

## 2025-07-14 DIAGNOSIS — M85.80 OSTEOPENIA, UNSPECIFIED LOCATION: ICD-10-CM

## 2025-07-14 DIAGNOSIS — R00.2 PALPITATION: ICD-10-CM

## 2025-07-14 DIAGNOSIS — Z00.00 ENCOUNTER FOR ANNUAL HEALTH EXAMINATION: Primary | ICD-10-CM

## 2025-07-14 LAB
ATRIAL RATE: 82 BPM
P AXIS: 27 DEGREES
P-R INTERVAL: 162 MS
Q-T INTERVAL: 384 MS
QRS DURATION: 86 MS
QTC CALCULATION (BEZET): 448 MS
R AXIS: -23 DEGREES
T AXIS: 51 DEGREES
VENTRICULAR RATE: 82 BPM

## 2025-07-14 PROCEDURE — 93010 ELECTROCARDIOGRAM REPORT: CPT | Performed by: INTERNAL MEDICINE

## 2025-07-14 PROCEDURE — 93005 ELECTROCARDIOGRAM TRACING: CPT

## 2025-07-14 NOTE — PROGRESS NOTES
Subjective:   Alicia Mckeon is a 69 year old female who presents for a Medicare Subsequent Annual Wellness visit (Pt already had Initial Annual Wellness) and scheduled follow up of multiple significant but stable problems.           1. Encounter for annual health examination (Primary)- doing well overall. Continues to be physically active.   2. Palpitation  -     has been told twice now when she donates blood and they check her BP that she is \"skipping a beat\". No symptoms.   3. Osteopenia, unspecified location- seeing Endo. Has started Prolia.     History/Other:   Fall Risk Assessment:   She has been screened for Falls and is low risk.      Cognitive Assessment:   She had a completely normal cognitive assessment - see flowsheet entries     Functional Ability/Status:   Alicia Mckeon has a completely normal functional assessment. See flowsheet for details.      Depression Screening (PHQ):  PHQ-2 SCORE: 0  , done 7/11/2025             Advanced Directives:   She does NOT have a Living Will. [Do you have a living will?: (Patient-Rptd) No]  She does NOT have a Power of  for Health Care. [Do you have a healthcare power of ?: (Patient-Rptd) No]  Patient has Advance Care Planning documents but we do not have a copy in EMR. Discussed Advanced Care Planning with patient and instructed patient to get our office a copy to be scanned into EMR.      Patient Active Problem List   Diagnosis    Osteopenia    Vaginal dryness    H/O colonoscopy with polypectomy     Allergies:  She has no known allergies.    Current Medications:  Outpatient Medications Marked as Taking for the 7/14/25 encounter (Office Visit) with Domonique Gandara, DO   Medication Sig    Magnesium 80 MG Oral Tab Take 4.75 tablets (380 mg total) by mouth BID (Nitrates).    Calcium Carbonate Antacid 400 MG Oral Chew Tab Chew 0.45 tablets (180 mg total) by mouth BID (Nitrates).    Multiple Vitamins-Minerals (MULTIVITAMIN ADULT OR) Take by mouth.        Medical History:  She  has a past medical history of Allergic rhinitis, Anxiety (), Arthritis, Belching, Body piercing (), Depression (), Flatulence/gas pain/belching, Hemorrhoids, History of depression (2016), Osteoarthritis, Osteopenia, Tubular adenoma (2007), and Wears glasses ().  Surgical History:  She  has a past surgical history that includes colonoscopy (2007); other surgical history (10/01/2001); d & c (); and .   Family History:  Her She was adopted. Family history is unknown by patient.  Social History:  She  reports that she has never smoked. She has never used smokeless tobacco. She reports current alcohol use of about 1.0 standard drink of alcohol per week. She reports that she does not use drugs.    Tobacco:  She has never smoked tobacco.    CAGE Alcohol Screen:   CAGE screening score of 0 on 2025, showing low risk of alcohol abuse.      Patient Care Team:  Domonique Gandara DO as PCP - General (Family Medicine)  Kirstin Rivas, PT as Physical Therapist (Physical Therapy)  Darron Bray MD (ENDOCRINOLOGY)    Review of Systems  GENERAL: feels well otherwise  SKIN: denies any unusual skin lesions  EYES: denies blurred vision or double vision  HEENT: denies nasal congestion, sinus pain or ST  LUNGS: denies shortness of breath with exertion  CARDIOVASCULAR: denies chest pain on exertion  GI: denies abdominal pain, denies heartburn  : denies dysuria, vaginal discharge or itching, no complaint of urinary incontinence   MUSCULOSKELETAL: denies back pain  NEURO: denies headaches  PSYCHE: denies depression or anxiety  HEMATOLOGIC: denies hx of anemia  ENDOCRINE: denies thyroid history  ALL/ASTHMA: denies hx of allergy or asthma    Objective:   Physical Exam  General Appearance:  Alert, cooperative, no distress, appears stated age   Head:  Normocephalic, without obvious abnormality, atraumatic   Eyes:  PERRL, conjunctiva/corneas clear, EOM's intact both  eyes   Ears:  Normal TM's and external ear canals, both ears   Nose: Nares normal, septum midline,mucosa normal, no drainage or sinus tenderness   Throat: Lips, mucosa, and tongue normal; teeth and gums normal   Neck: Supple, symmetrical, trachea midline, no adenopathy;  thyroid: not enlarged, symmetric, no tenderness/mass/nodules; no carotid bruit or JVD   Back:   Symmetric, no curvature, ROM normal, no CVA tenderness   Lungs:   Clear to auscultation bilaterally, respirations unlabored   Heart:  Regular rate and rhythm, S1 and S2 normal, no murmur, rub, or gallop       Pelvic: Deferred   Extremities: Extremities normal, atraumatic, no cyanosis or edema   Pulses: 2+ and symmetric   Skin: Skin color, texture, turgor normal, no rashes or lesions   Lymph nodes: Cervical, supraclavicular, and axillary nodes normal   Neurologic: Normal       /68 (BP Location: Right arm, Patient Position: Sitting, Cuff Size: adult)   Pulse 97   Temp 97.9 °F (36.6 °C) (Temporal)   Resp 16   Ht 5' 4\" (1.626 m)   Wt 136 lb 6.4 oz (61.9 kg)   SpO2 98%   BMI 23.41 kg/m²  Estimated body mass index is 23.41 kg/m² as calculated from the following:    Height as of this encounter: 5' 4\" (1.626 m).    Weight as of this encounter: 136 lb 6.4 oz (61.9 kg).    Medicare Hearing Assessment:   Hearing Screening    Time taken: 7/14/2025  9:02 AM  Screening Method: Finger Rub  Finger Rub Result: Pass         Visual Acuity:   Right Eye Visual Acuity: Corrected Right Eye Chart Acuity: 20/40   Left Eye Visual Acuity: Corrected Left Eye Chart Acuity: 20/40   Both Eyes Visual Acuity: Corrected Both Eyes Chart Acuity: 20/30   Able To Tolerate Visual Acuity: Yes        Assessment & Plan:   Alicia Mckeon is a 69 year old female who presents for a Medicare Assessment.     1. Encounter for annual health examination (Primary)  2. Palpitation  -     EKG 12 Lead; Future; Expected date: 07/14/2025  3. Osteopenia, unspecified location            The patient  indicates understanding of these issues and agrees to the plan.  Reinforced healthy diet, lifestyle, and exercise.  1. Encounter for annual health examination (Primary)- Reviewed age-appropriate preventive health and safety recommendations with patient. Reviewed lab results. Encouraged regular exercise and healthy eating.   2. Palpitation- f/u sooner if symptoms.   -     EKG 12 Lead; Future; Expected date: 07/14/2025  3. Osteopenia, unspecified location- continue f/u Endo.     Return in 1 year (on 7/14/2026).     Domonique Gandara DO, 7/14/2025     Supplementary Documentation:   General Health:  In the past six months, have you lost more than 10 pounds without trying?: (Patient-Rptd) 2 - No  Has your appetite been poor?: (Patient-Rptd) No  Type of Diet: (Patient-Rptd) Balanced  How does the patient maintain a good energy level?: (Patient-Rptd) Appropriate Exercise, Daily Walks  How would you describe your daily physical activity?: (Patient-Rptd) Moderate  How would you describe your current health state?: (Patient-Rptd) Good  How do you maintain positive mental well-being?: (Patient-Rptd) Social Interaction, Visiting Friends, Visiting Family  On a scale of 0 to 10, with 0 being no pain and 10 being severe pain, what is your pain level?: (Patient-Rptd) 1 - (Mild)  In the past six months, have you experienced urine leakage?: (Patient-Rptd) 1-Yes  At any time do you feel concerned for the safety/well-being of yourself and/or your children, in your home or elsewhere?: (Patient-Rptd) No  Have you had any immunizations at another office such as Influenza, Hepatitis B, Tetanus, or Pneumococcal?: (Patient-Rptd) Yes    Health Maintenance   Topic Date Due    COVID-19 Vaccine (7 - 2024-25 season) 09/01/2024    Annual Depression Screening  01/01/2025    Annual Physical  07/15/2025    Influenza Vaccine (1) 10/01/2025    Mammogram  04/25/2026    Colorectal Cancer Screening  01/22/2028    DEXA Scan  Completed    Fall Risk Screening  (Annual)  Completed    Pneumococcal Vaccine: 50+ Years  Completed    Zoster Vaccines  Completed    Meningococcal B Vaccine  Aged Out

## 2025-07-18 ENCOUNTER — TELEPHONE (OUTPATIENT)
Age: 70
End: 2025-07-18

## 2025-07-18 NOTE — TELEPHONE ENCOUNTER
Received fax from [facility], labs result, lab collected 7/7/2025  Abstracted   AMS review result     Labs enter     Send to scan

## (undated) NOTE — LETTER
Patient Name: Manisha Coello  YOB: 1955          MRN :  [id number]  Date:  3/24/2022  Referring Physician:  Denise Chavira    Discharge Summary  Pt has attended 9 visits in Physical Therapy. 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 Pelvic floor muscles feel stronger. Doing strategies and HEP. Nocturia 0-1x (was:1-2x). L sciatic pain has been better this week and doing psoas/hip flexor HEP. Less \"JIC\" now. .  Current symptoms include: urgency/frequency, incomplete emptying and leakage-ALL RESOLVED    Pt goals include improve strength of pelvic floor muscles to decrease/resolve urgency and leakage-MET    PFDI-20:10/4/300 (was: 86.46/300); Impairment= 3.5% (was:28.8 %)    Pain: 0/10-no back or pelvic pain      Objective: Tx flow sheet      URINARY HABITS  Leaking occurs: NEGIN, UI-  No leakage  Episodes of Leakage: 0x (was:3 times per week)  Amount of leakage: none (was:minimal)  Nocturia: 1-2x (was:2x)-having sleep disturbance and waking up at 3 pm  Fluid Intake: attempting to consume 60 oz, significant decrease in diet pop (was:some water)  Empty bladder just in case: not anymore (was:yes)  Do you ever leak urine without knowing it?  No (was:yes)  Voiding: every 2-3 hours    BOWEL HABITS  Types of symptoms: other some times strains -not often  Frequency of bowel movements: daily , some times 2x/day  Stool consistency: [place] Stool Scale: 2-4  Do you strain with defecation: Yes -  Some -not often    Range Of Motion  Lumbar AROM screen: wfl  LE AROM screen: grossly WNL except: B hip IR 25%-no c/o pain    Strength (MMT) 5/5 AMADEO LE except B hip abduction and extension 4+/5 (was:4-/5)  Transverse Abdominis: 2/5 (was:1/5)    Internal Examination   Pelvic Floor Muscle strength: (PERF= Power/Endurance/Reps/Fast) MMT: 4/10/10/10 (was:2/4/4/5)  Accessory Muscle Use: abdominals-able to isolate 1/27/22  Biofeedback: Recruitment good, holding ability poor, derecruitment good, baseline between contractions . 05-2.0Ua, baseline resting average . 05Ua (normal 2.0Ua)        Assessment: Pt has made significant improvement in physical therapy with increased pelvic floor muscles strength and coordination and function with resolution of leakage, improved bowel and bladder habits and increased hip and core strength. All goals met. Pt motivated to continue HEP. Goals:   Goals: (to be met in 10 visits)-  1. Independent in HEP and progression with improved understanding of bladder/bowel health, bladder retraining and long-term management and more neutral posture. -MET   2. Patient will demonstrate improved bladder voiding habits to voiding every 2 hours without urinary leakage. -MET  3. Patient will demonstrate improve PFM isolation, coordination and strength to 3/8/8/8 so she is able to reduce urinary urge and prevent leakage during ADL's.-MET  4. Nocturnal voiding 1x/night for improved sleep. -MET  5. PFM contraction before increase intra-abdominal pressure. -MET  6. Pt will have increased transverse abdominis muscle strength to 2/5 and hip strength to 4+/5 to assist with supporting pelvic floor muscles. -MET    Plan: Pt considered discharged from physical therapy. Pt instructed to call clinic if he/she has any further questions and to continue home exercise program.     Patient/Family/Caregiver was advised of these findings, precautions, and treatment options and has agreed to actively participate in planning and for this course of care. Thank you for your referral. If you have any questions, please contact me at Dept: [phone].     Sincerely,  Electronically signed by therapist: Shala Salguero PT